# Patient Record
Sex: FEMALE | Race: WHITE | NOT HISPANIC OR LATINO | Employment: FULL TIME | URBAN - NONMETROPOLITAN AREA
[De-identification: names, ages, dates, MRNs, and addresses within clinical notes are randomized per-mention and may not be internally consistent; named-entity substitution may affect disease eponyms.]

---

## 2016-05-26 LAB — HIV 1&2 EXT: NORMAL

## 2018-03-08 ENCOUNTER — DOCUMENTATION ONLY (OUTPATIENT)
Dept: FAMILY MEDICINE | Facility: OTHER | Age: 33
End: 2018-03-08

## 2018-03-08 RX ORDER — BREAST PUMP
EACH MISCELLANEOUS
COMMUNITY
Start: 2016-10-01 | End: 2023-02-09

## 2018-03-08 RX ORDER — PRENATAL VIT/IRON FUM/FOLIC AC 27MG-0.8MG
1 TABLET ORAL DAILY
COMMUNITY
Start: 2016-08-26 | End: 2023-02-09

## 2018-03-08 RX ORDER — SERTRALINE HYDROCHLORIDE 25 MG/1
25 TABLET, FILM COATED ORAL DAILY
COMMUNITY
Start: 2016-11-11 | End: 2023-02-09

## 2019-05-23 ENCOUNTER — HOSPITAL ENCOUNTER (EMERGENCY)
Dept: HOSPITAL 11 - JP.ED | Age: 34
Discharge: HOME | End: 2019-05-23
Payer: MEDICAID

## 2019-05-23 DIAGNOSIS — Z91.048: ICD-10-CM

## 2019-05-23 DIAGNOSIS — S76.101A: ICD-10-CM

## 2019-05-23 DIAGNOSIS — S76.102A: Primary | ICD-10-CM

## 2019-05-23 DIAGNOSIS — Z88.1: ICD-10-CM
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
## 2019-05-23 PROCEDURE — 96372 THER/PROPH/DIAG INJ SC/IM: CPT

## 2019-05-23 PROCEDURE — 99283 EMERGENCY DEPT VISIT LOW MDM: CPT

## 2020-04-03 LAB — INR (EXTERNAL): 1.1 (ref 0.9–1.1)

## 2021-12-09 LAB
HPV ABSTRACT: NORMAL
PAP-ABSTRACT: NORMAL

## 2022-01-08 ENCOUNTER — HOSPITAL ENCOUNTER (EMERGENCY)
Dept: HOSPITAL 11 - JP.ED | Age: 37
Discharge: HOME | End: 2022-01-08
Payer: SELF-PAY

## 2022-01-08 DIAGNOSIS — Z91.048: ICD-10-CM

## 2022-01-08 DIAGNOSIS — R55: Primary | ICD-10-CM

## 2022-01-08 DIAGNOSIS — Z88.0: ICD-10-CM

## 2022-01-08 LAB — APAP SERPL-MCNC: 0 UG/ML (ref 10–30)

## 2022-01-08 PROCEDURE — 80053 COMPREHEN METABOLIC PANEL: CPT

## 2022-01-08 PROCEDURE — 96374 THER/PROPH/DIAG INJ IV PUSH: CPT

## 2022-01-08 PROCEDURE — 99284 EMERGENCY DEPT VISIT MOD MDM: CPT

## 2022-01-08 PROCEDURE — 80305 DRUG TEST PRSMV DIR OPT OBS: CPT

## 2022-01-08 PROCEDURE — 93005 ELECTROCARDIOGRAM TRACING: CPT

## 2022-01-08 PROCEDURE — 85025 COMPLETE CBC W/AUTO DIFF WBC: CPT

## 2022-01-08 PROCEDURE — 36415 COLL VENOUS BLD VENIPUNCTURE: CPT

## 2022-01-08 PROCEDURE — 80179 DRUG ASSAY SALICYLATE: CPT

## 2022-01-08 PROCEDURE — 93010 ELECTROCARDIOGRAM REPORT: CPT

## 2022-01-08 PROCEDURE — 80143 DRUG ASSAY ACETAMINOPHEN: CPT

## 2022-01-08 PROCEDURE — 80307 DRUG TEST PRSMV CHEM ANLYZR: CPT

## 2022-01-08 PROCEDURE — 81025 URINE PREGNANCY TEST: CPT

## 2022-01-08 PROCEDURE — 84443 ASSAY THYROID STIM HORMONE: CPT

## 2022-02-10 ENCOUNTER — TRANSFERRED RECORDS (OUTPATIENT)
Dept: MULTI SPECIALTY CLINIC | Facility: CLINIC | Age: 37
End: 2022-02-10

## 2022-02-10 LAB
ALT SERPL-CCNC: 11 IU/L (ref 6–31)
AST SERPL-CCNC: 16 IU/L (ref 10–40)
CREATININE (EXTERNAL): 0.83 MG/DL (ref 0.4–1)
GFR ESTIMATED (EXTERNAL): >60 ML/MIN/1.73M2
GLUCOSE (EXTERNAL): 91 MG/DL (ref 70–99)
POTASSIUM (EXTERNAL): 4.3 MEQ/L (ref 3.4–5.1)
TSH SERPL-ACNC: 4.27 UIU/ML (ref 0.4–3.99)

## 2023-02-09 ENCOUNTER — OFFICE VISIT (OUTPATIENT)
Dept: FAMILY MEDICINE | Facility: OTHER | Age: 38
End: 2023-02-09
Attending: PHYSICIAN ASSISTANT
Payer: COMMERCIAL

## 2023-02-09 VITALS
TEMPERATURE: 98.4 F | BODY MASS INDEX: 21.11 KG/M2 | HEART RATE: 83 BPM | OXYGEN SATURATION: 99 % | WEIGHT: 123 LBS | RESPIRATION RATE: 18 BRPM | SYSTOLIC BLOOD PRESSURE: 110 MMHG | DIASTOLIC BLOOD PRESSURE: 63 MMHG

## 2023-02-09 DIAGNOSIS — Z30.013 ENCOUNTER FOR INITIAL PRESCRIPTION OF INJECTABLE CONTRACEPTIVE: Primary | ICD-10-CM

## 2023-02-09 DIAGNOSIS — Z30.42 DEPO-PROVERA CONTRACEPTIVE STATUS: ICD-10-CM

## 2023-02-09 LAB — HCG UR QL: NEGATIVE

## 2023-02-09 PROCEDURE — 99203 OFFICE O/P NEW LOW 30 MIN: CPT | Performed by: PHYSICIAN ASSISTANT

## 2023-02-09 PROCEDURE — 250N000011 HC RX IP 250 OP 636: Performed by: PHYSICIAN ASSISTANT

## 2023-02-09 PROCEDURE — 96372 THER/PROPH/DIAG INJ SC/IM: CPT | Performed by: PHYSICIAN ASSISTANT

## 2023-02-09 PROCEDURE — 81025 URINE PREGNANCY TEST: CPT | Mod: ZL | Performed by: PHYSICIAN ASSISTANT

## 2023-02-09 RX ORDER — MEDROXYPROGESTERONE ACETATE 150 MG/ML
150 INJECTION, SUSPENSION INTRAMUSCULAR ONCE
Status: COMPLETED | OUTPATIENT
Start: 2023-02-09 | End: 2023-02-09

## 2023-02-09 RX ADMIN — MEDROXYPROGESTERONE ACETATE 150 MG: 150 INJECTION, SUSPENSION INTRAMUSCULAR at 16:04

## 2023-02-09 ASSESSMENT — PAIN SCALES - GENERAL: PAINLEVEL: NO PAIN (0)

## 2023-02-09 NOTE — PROGRESS NOTES
Assessment & Plan   Problem List Items Addressed This Visit        Other    Depo-Provera contraceptive status   Other Visit Diagnoses     Encounter for initial prescription of injectable contraceptive    -  Primary    Relevant Medications    medroxyPROGESTERone (DEPO-PROVERA) injection 150 mg (Completed)    Other Relevant Orders    Pregnancy, Urine (HCG) (Completed)         Completed UPT which is negative.  Patient was given a Depo-Provera injection.  Gave side effect profile.  Encouraged to set up a full physical with a Pap test either prior to her next Depo-Provera injection or in 3 months when her next Depo-Provera injection is due.  Gave side effect profile.  Gave patient occasion.  Highly stressed the need to not use nicotine products with Depo-Provera.    Ordering of each unique test  Prescription drug management         See Patient Instructions    Return in about 3 months (around 5/9/2023) for Physical with pap test.    Sofy Bolivar PA-C  [facility] AND [facility]    Jose Chacko is a 37 year old, presenting for the following health issues:  Contraception      Contraception    History of Present Illness       Reason for visit:  Depo    She eats 0-1 servings of fruits and vegetables daily.She consumes 1 sweetened beverage(s) daily.She exercises with enough effort to increase her heart rate 10 to 19 minutes per day.  She exercises with enough effort to increase her heart rate 4 days per week.   She is taking medications regularly.       Depo birth control.     Patient presents today wanting to restart birth control.  Previously was on Depo-Provera injections.  Tolerated the medication well in the past.  Currently has children and does not prefer to have another.  History of trying Mirena which made her feel uncomfortable and the ParaGard which caused intense, crampy, heavy periods.  Tolerated the Depo and would like to start today.  Currently just getting over her menstrual cycle.  No  pregnancy or STD concerns.  Declines testing today.  Due for a Pap test.  Would like to set up a physical with a Pap test appointment.  Otherwise feeling well.    Pap test:  12/9/2021-negative HPV, normal Pap  4/3/2020- positive HPV, normal Pap    Review of Systems   Constitutional, HEENT, cardiovascular, pulmonary, gi and gu systems are negative, except as otherwise noted.      Objective    /63 (BP Location: Right arm, Patient Position: Sitting, Cuff Size: Adult Regular)   Pulse 83   Temp 98.4  F (36.9  C) (Tympanic)   Resp 18   Wt 55.8 kg (123 lb)   LMP 02/05/2023 (Exact Date)   SpO2 99%   BMI 21.11 kg/m    Body mass index is 21.11 kg/m .  Physical Exam   GENERAL: healthy, alert and no distress  RESP: lungs clear to auscultation - no rales, rhonchi or wheezes  CV: regular rate and rhythm, normal S1 S2, no S3 or S4, no murmur, click or rub, no peripheral edema and peripheral pulses strong  MS: no gross musculoskeletal defects noted, no edema  SKIN: no suspicious lesions or rashes  NEURO: Normal strength and tone, mentation intact and speech normal  PSYCH: mentation appears normal, affect normal/bright    Results for orders placed or performed in visit on 02/09/23   Pregnancy, Urine (HCG)     Status: Normal   Result Value Ref Range    hCG Urine Qualitative Negative Negative

## 2023-02-09 NOTE — NURSING NOTE
Pt presents to clinic today for depo birth control. Currenty not on any contraception.       FOOD SECURITY SCREENING QUESTIONS:    The next two questions are to help us understand your food security.  If you are feeling you need any assistance in this area, we have resources available to support you today.    Hunger Vital Signs:  Within the past 12 months we worried whether our food would run out before we got money to buy more. Never  Within the past 12 months the food we bought just didn't last and we didn't have money to get more. Never          Medication Reconciliation: complete  Phylicia Rosario LPN,LPN on 2/9/2023 at 3:28 PM

## 2023-02-10 PROBLEM — Z30.42 DEPO-PROVERA CONTRACEPTIVE STATUS: Status: ACTIVE | Noted: 2023-02-10

## 2023-03-10 ENCOUNTER — ALLIED HEALTH/NURSE VISIT (OUTPATIENT)
Dept: FAMILY MEDICINE | Facility: OTHER | Age: 38
End: 2023-03-10
Payer: COMMERCIAL

## 2023-03-10 DIAGNOSIS — Z23 NEED FOR VACCINATION: Primary | ICD-10-CM

## 2023-03-10 PROCEDURE — 90471 IMMUNIZATION ADMIN: CPT

## 2023-03-10 PROCEDURE — 90632 HEPA VACCINE ADULT IM: CPT

## 2023-03-10 NOTE — PROGRESS NOTES
Immunization Documentation  Verified patient's first and last name, and . Stated reason for visit today is to receive HEPATITIS A vaccine. Accompained to clinic visit with SELF. Denied any concerns with previous immunizations. Allergies reviewed. VIS handout(s) reviewed and given to take home. VACCINE prepared and administered per standing order. Administration documented in IMMUNIZATIONS (see flowsheet and order for further information). Instructed to wait in lobby for 15 minutes post-injection and notify staff immediately of any reaction.     BARBARA KENT RN on 3/10/2023 at 8:47 AM

## 2023-05-06 ENCOUNTER — HEALTH MAINTENANCE LETTER (OUTPATIENT)
Age: 38
End: 2023-05-06

## 2023-05-16 ENCOUNTER — OFFICE VISIT (OUTPATIENT)
Dept: FAMILY MEDICINE | Facility: OTHER | Age: 38
End: 2023-05-16
Attending: PHYSICIAN ASSISTANT
Payer: COMMERCIAL

## 2023-05-16 VITALS
DIASTOLIC BLOOD PRESSURE: 82 MMHG | WEIGHT: 120.8 LBS | BODY MASS INDEX: 20.62 KG/M2 | TEMPERATURE: 98.7 F | HEART RATE: 91 BPM | RESPIRATION RATE: 16 BRPM | OXYGEN SATURATION: 98 % | SYSTOLIC BLOOD PRESSURE: 94 MMHG | HEIGHT: 64 IN

## 2023-05-16 DIAGNOSIS — Z13.29 SCREENING FOR THYROID DISORDER: ICD-10-CM

## 2023-05-16 DIAGNOSIS — B96.89 BV (BACTERIAL VAGINOSIS): Primary | ICD-10-CM

## 2023-05-16 DIAGNOSIS — Z13.220 SCREENING CHOLESTEROL LEVEL: ICD-10-CM

## 2023-05-16 DIAGNOSIS — Z00.00 ROUTINE GENERAL MEDICAL EXAMINATION AT A HEALTH CARE FACILITY: Primary | ICD-10-CM

## 2023-05-16 DIAGNOSIS — N92.6 ABNORMAL MENSES: ICD-10-CM

## 2023-05-16 DIAGNOSIS — Z01.419 PAP TEST, AS PART OF ROUTINE GYNECOLOGICAL EXAMINATION: ICD-10-CM

## 2023-05-16 DIAGNOSIS — N76.0 BV (BACTERIAL VAGINOSIS): Primary | ICD-10-CM

## 2023-05-16 DIAGNOSIS — Z13.1 SCREENING FOR DIABETES MELLITUS: ICD-10-CM

## 2023-05-16 DIAGNOSIS — Z11.3 SCREEN FOR STD (SEXUALLY TRANSMITTED DISEASE): ICD-10-CM

## 2023-05-16 LAB
ANION GAP SERPL CALCULATED.3IONS-SCNC: 10 MMOL/L (ref 7–15)
BASOPHILS # BLD AUTO: 0.1 10E3/UL (ref 0–0.2)
BASOPHILS NFR BLD AUTO: 1 %
BUN SERPL-MCNC: 12.3 MG/DL (ref 6–20)
C TRACH DNA SPEC QL PROBE+SIG AMP: NEGATIVE
CALCIUM SERPL-MCNC: 9.2 MG/DL (ref 8.6–10)
CHLORIDE SERPL-SCNC: 108 MMOL/L (ref 98–107)
CHOLEST SERPL-MCNC: 150 MG/DL
CLUE CELLS: PRESENT
CREAT SERPL-MCNC: 0.79 MG/DL (ref 0.51–0.95)
DEPRECATED HCO3 PLAS-SCNC: 25 MMOL/L (ref 22–29)
EOSINOPHIL # BLD AUTO: 0.1 10E3/UL (ref 0–0.7)
EOSINOPHIL NFR BLD AUTO: 1 %
ERYTHROCYTE [DISTWIDTH] IN BLOOD BY AUTOMATED COUNT: 13.2 % (ref 10–15)
GFR SERPL CREATININE-BSD FRML MDRD: >90 ML/MIN/1.73M2
GLUCOSE SERPL-MCNC: 91 MG/DL (ref 70–99)
HCG INTACT+B SERPL-ACNC: <1 MIU/ML
HCT VFR BLD AUTO: 40.6 % (ref 35–47)
HDLC SERPL-MCNC: 75 MG/DL
HGB BLD-MCNC: 13.4 G/DL (ref 11.7–15.7)
HOLD SPECIMEN: NORMAL
IMM GRANULOCYTES # BLD: 0 10E3/UL
IMM GRANULOCYTES NFR BLD: 0 %
LDLC SERPL CALC-MCNC: 64 MG/DL
LYMPHOCYTES # BLD AUTO: 1.6 10E3/UL (ref 0.8–5.3)
LYMPHOCYTES NFR BLD AUTO: 25 %
MCH RBC QN AUTO: 30.8 PG (ref 26.5–33)
MCHC RBC AUTO-ENTMCNC: 33 G/DL (ref 31.5–36.5)
MCV RBC AUTO: 93 FL (ref 78–100)
MONOCYTES # BLD AUTO: 0.5 10E3/UL (ref 0–1.3)
MONOCYTES NFR BLD AUTO: 8 %
N GONORRHOEA DNA SPEC QL NAA+PROBE: NEGATIVE
NEUTROPHILS # BLD AUTO: 4.1 10E3/UL (ref 1.6–8.3)
NEUTROPHILS NFR BLD AUTO: 65 %
NONHDLC SERPL-MCNC: 75 MG/DL
NRBC # BLD AUTO: 0 10E3/UL
NRBC BLD AUTO-RTO: 0 /100
PLATELET # BLD AUTO: 282 10E3/UL (ref 150–450)
POTASSIUM SERPL-SCNC: 3.9 MMOL/L (ref 3.4–5.3)
RBC # BLD AUTO: 4.35 10E6/UL (ref 3.8–5.2)
SODIUM SERPL-SCNC: 143 MMOL/L (ref 136–145)
TRICHOMONAS, WET PREP: ABNORMAL
TRIGL SERPL-MCNC: 55 MG/DL
TSH SERPL DL<=0.005 MIU/L-ACNC: 3.87 UIU/ML (ref 0.3–4.2)
WBC # BLD AUTO: 6.3 10E3/UL (ref 4–11)
WBC'S/HIGH POWER FIELD, WET PREP: ABNORMAL
YEAST, WET PREP: ABNORMAL

## 2023-05-16 PROCEDURE — 87210 SMEAR WET MOUNT SALINE/INK: CPT | Mod: ZL | Performed by: PHYSICIAN ASSISTANT

## 2023-05-16 PROCEDURE — 84443 ASSAY THYROID STIM HORMONE: CPT | Mod: ZL | Performed by: PHYSICIAN ASSISTANT

## 2023-05-16 PROCEDURE — 80061 LIPID PANEL: CPT | Mod: ZL | Performed by: PHYSICIAN ASSISTANT

## 2023-05-16 PROCEDURE — 85014 HEMATOCRIT: CPT | Mod: ZL | Performed by: PHYSICIAN ASSISTANT

## 2023-05-16 PROCEDURE — 87624 HPV HI-RISK TYP POOLED RSLT: CPT | Mod: ZL | Performed by: PHYSICIAN ASSISTANT

## 2023-05-16 PROCEDURE — 87591 N.GONORRHOEAE DNA AMP PROB: CPT | Mod: ZL | Performed by: PHYSICIAN ASSISTANT

## 2023-05-16 PROCEDURE — 87491 CHLMYD TRACH DNA AMP PROBE: CPT | Mod: ZL | Performed by: PHYSICIAN ASSISTANT

## 2023-05-16 PROCEDURE — G0123 SCREEN CERV/VAG THIN LAYER: HCPCS | Performed by: PHYSICIAN ASSISTANT

## 2023-05-16 PROCEDURE — 36415 COLL VENOUS BLD VENIPUNCTURE: CPT | Mod: ZL | Performed by: PHYSICIAN ASSISTANT

## 2023-05-16 PROCEDURE — 82435 ASSAY OF BLOOD CHLORIDE: CPT | Mod: ZL | Performed by: PHYSICIAN ASSISTANT

## 2023-05-16 PROCEDURE — 84702 CHORIONIC GONADOTROPIN TEST: CPT | Mod: ZL | Performed by: PHYSICIAN ASSISTANT

## 2023-05-16 PROCEDURE — 99395 PREV VISIT EST AGE 18-39: CPT | Performed by: PHYSICIAN ASSISTANT

## 2023-05-16 RX ORDER — METRONIDAZOLE 500 MG/1
500 TABLET ORAL 2 TIMES DAILY
Qty: 14 TABLET | Refills: 0 | Status: SHIPPED | OUTPATIENT
Start: 2023-05-16 | End: 2023-05-23

## 2023-05-16 ASSESSMENT — ENCOUNTER SYMPTOMS
ABDOMINAL PAIN: 1
FREQUENCY: 0
PALPITATIONS: 0
JOINT SWELLING: 0
FEVER: 0
SHORTNESS OF BREATH: 0
BREAST MASS: 0
ARTHRALGIAS: 0
PARESTHESIAS: 0
NAUSEA: 0
DIZZINESS: 0
COUGH: 0
CHILLS: 0
HEMATOCHEZIA: 0
EYE PAIN: 0
MYALGIAS: 0
HEADACHES: 0
HEARTBURN: 0
CONSTIPATION: 0
WEAKNESS: 0
HEMATURIA: 0
SORE THROAT: 0
DYSURIA: 0
NERVOUS/ANXIOUS: 1
DIARRHEA: 0

## 2023-05-16 ASSESSMENT — PAIN SCALES - GENERAL: PAINLEVEL: MILD PAIN (3)

## 2023-05-16 NOTE — NURSING NOTE
"Pt presents to clinic today for physical with pap. Patient reports concerns of irregularity with vaginal bleeding/cycle and reports no true cycle since shot in February. Patient reports \"it's not consistently bleeding or spotting, it's all over the place, it can be chunky. And I started bleeding more before the shot wore off.\" Patient would like to discuss an ultrasound due to hx or ovarian cysts and possible labs to confirm/deny pregnancy.      FOOD SECURITY SCREENING QUESTIONS:    The next two questions are to help us understand your food security.  If you are feeling you need any assistance in this area, we have resources available to support you today.    Hunger Vital Signs:  Within the past 12 months we worried whether our food would run out before we got money to buy more. Never  Within the past 12 months the food we bought just didn't last and we didn't have money to get more. Never        Advance care directive on file? No  Advance care directive provided to patient? declined     Medication Reconciliation: complete  Ranjana Waldrop LPN,LPN on 5/16/2023 at 8:03 AM     "

## 2023-05-16 NOTE — PROGRESS NOTES
SUBJECTIVE:   CC: Ginna is an 37 year old who presents for preventive health visit.       5/16/2023     7:57 AM   Additional Questions   Roomed by Ranjana   Patient has been advised of split billing requirements and indicates understanding: Yes  Healthy Habits:     Getting at least 3 servings of Calcium per day:  Yes    Bi-annual eye exam:  NO    Dental care twice a year:  Yes    Sleep apnea or symptoms of sleep apnea:  None    Diet:  Regular (no restrictions)    Frequency of exercise:  1 day/week    Duration of exercise:  Less than 15 minutes    Taking medications regularly:  Yes    Medication side effects:  Not applicable    PHQ-2 Total Score: 0    Additional concerns today:  Yes      Patient's last menstrual period was 02/05/2023 (approximate).   Contraception: depo in February, declines birth control today  Risk for STI?: no exposures however she would like to be screened  Last pap: 12/9/2021 - normal pap and HPV, needs to be repeated  4/3/2020 - positive HPV and negative pap  Any hx of abnormal paps:  yes  FH of early CA?: no  Cholesterol/DM concerns/screening: none, completed  Tobacco?: former  Lung cancer screening: na  Calcium intake: yes  DEXA: na  Last mammo: na, denies breast concerns  Colonoscopy: na  Hepatitis C screen: none, declines testing   HIV screen: 8/24/2012 - negative   Immunizations: declines Tdap     Patient recently got a Depo-Provera shot on 2/9/2023.  Was previously on Depo-Provera injections prior to the birth of her second child.  This was over 10 years ago.  History of tried the Mirena which made her feel uncomfortable and the ParaGard which caused intense, crampy, heavy periods.  In the last month of the 3-month Depo window she started getting irregular bleeding.  The amount of bleeding would go up and down.  Having cramps on the lower abdomen that radiates to the lower back.  No STD concerns however she would like to be tested.  Has had unprotected intercourse over the last  month.  Would like a pregnancy test today as well.  History of cyst in the past.  Interested in having a pelvic ultrasound to rule out concerns.  Due for a Pap test.  Not interested in continuing Depo-Provera.  Would like to hold off on birth control at this time.  Wants to keep her options open in regards to possible pregnancies for the future.    Today's PHQ-2 Score:       2023     7:49 AM   PHQ-2 (  [employer])   Q1: Little interest or pleasure in doing things 0   Q2: Feeling down, depressed or hopeless 0   PHQ-2 Score 0   Q1: Little interest or pleasure in doing things Not at all    Not at all   Q2: Feeling down, depressed or hopeless Not at all    Not at all   PHQ-2 Score 0    0       Have you ever done Advance Care Planning? (For example, a Health Directive, POLST, or a discussion with a medical provider or your loved ones about your wishes): No, advance care planning information given to patient to review.  Patient declined advance care planning discussion at this time.    Social History     Tobacco Use     Smoking status: Former     Types: Cigarettes     Quit date: 2013     Years since quittin.6     Passive exposure: Past     Smokeless tobacco: Never   Vaping Use     Vaping status: Every Day     Substances: Nicotine, Flavoring     Devices: Disposable   Substance Use Topics     Alcohol use: Yes     Comment: occ             2023     7:49 AM   Alcohol Use   Prescreen: >3 drinks/day or >7 drinks/week? No     Reviewed orders with patient.  Reviewed health maintenance and updated orders accordingly - Yes  Labs reviewed in EPIC  BP Readings from Last 3 Encounters:   23 94/82   23 110/63   16 110/60    Wt Readings from Last 3 Encounters:   23 54.8 kg (120 lb 12.8 oz)   23 55.8 kg (123 lb)   16 64.3 kg (141 lb 12.8 oz)                  Patient Active Problem List   Diagnosis     Depo-Provera contraceptive status     Past Surgical History:   Procedure Laterality  Date     OTHER SURGICAL HISTORY      ,[id number],UMBILICAL HERNIA REPAIR     NV BREAST AUGMENTATION         Social History     Tobacco Use     Smoking status: Former     Types: Cigarettes     Quit date: 2013     Years since quittin.6     Passive exposure: Past     Smokeless tobacco: Never   Vaping Use     Vaping status: Every Day     Substances: Nicotine, Flavoring     Devices: Disposable   Substance Use Topics     Alcohol use: Yes     Comment: occ     History reviewed. No pertinent family history.      No current outpatient medications on file.     Allergies   Allergen Reactions     Amoxicillin      Other reaction(s): Stomach Upset  Stomach upset  Stomach upset     Amoxicillin-Pot Clavulanate      Other reaction(s): Vomiting     Amoxicillin-Pot Clavulanate      Nickel Rash     Sertraline Other (See Comments)     Nightmares, felt hallowed out     Recent Labs   Lab Test 16  2305   CR 0.47*   GFRESTBLACK >60   POTASSIUM 3.5        Breast Cancer Screenin/16/2023     7:49 AM   Breast CA Risk Assessment (FHS-7)   Do you have a family history of breast, colon, or ovarian cancer? No / Unknown         Patient under 40 years of age: Routine Mammogram Screening not recommended.   Pertinent mammograms are reviewed under the imaging tab.    History of abnormal Pap smear: Last 3 Pap and HPV Results:         Reviewed and updated as needed this visit by clinical staff   Tobacco  Allergies  Meds  Problems  Med Hx  Surg Hx  Fam Hx          Reviewed and updated as needed this visit by Provider   Tobacco  Allergies  Meds  Problems  Med Hx  Surg Hx  Fam Hx         Past Medical History:   Diagnosis Date     Pregnant state, incidental           Past Surgical History:   Procedure Laterality Date     OTHER SURGICAL HISTORY      ,[id number],UMBILICAL HERNIA REPAIR     NV BREAST AUGMENTATION       OB History    Para Term  AB Living   3 3 3 0 0 3   SAB IAB Ectopic Multiple Live Births  "  0 0 0 0 0       Review of Systems   Constitutional: Negative for chills and fever.   HENT: Positive for congestion. Negative for ear pain, hearing loss and sore throat.    Eyes: Negative for pain and visual disturbance.   Respiratory: Negative for cough and shortness of breath.    Cardiovascular: Negative for chest pain, palpitations and peripheral edema.   Gastrointestinal: Positive for abdominal pain. Negative for constipation, diarrhea, heartburn, hematochezia and nausea.   Breasts:  Negative for tenderness, breast mass and discharge.   Genitourinary: Positive for pelvic pain and vaginal bleeding. Negative for dysuria, frequency, genital sores, hematuria, urgency and vaginal discharge.   Musculoskeletal: Negative for arthralgias, joint swelling and myalgias.   Skin: Negative for rash.   Neurological: Negative for dizziness, weakness, headaches and paresthesias.   Psychiatric/Behavioral: Negative for mood changes. The patient is nervous/anxious.           OBJECTIVE:   BP 94/82 (BP Location: Right arm, Patient Position: Sitting, Cuff Size: Adult Regular)   Pulse 91   Temp 98.7  F (37.1  C) (Tympanic)   Resp 16   Ht 1.626 m (5' 4\")   Wt 54.8 kg (120 lb 12.8 oz)   LMP 02/05/2023 (Approximate)   SpO2 98%   BMI 20.74 kg/m    Physical Exam  GENERAL: healthy, alert and no distress  EYES: Eyes grossly normal to inspection, PERRL and conjunctivae and sclerae normal  HENT: ear canals and TM's normal, nose and mouth without ulcers or lesions  NECK: no adenopathy, no asymmetry, masses, or scars and thyroid normal to palpation  RESP: lungs clear to auscultation - no rales, rhonchi or wheezes  CV: regular rate and rhythm, normal S1 S2, no S3 or S4, no murmur, click or rub, no peripheral edema and peripheral pulses strong  ABDOMEN: soft, no hepatosplenomegaly, no masses and bowel sounds normal.  Mild pain to palpation over the left lower quadrant, right lower quadrant, and suprapubic region.   (female): normal female " external genitalia, normal urethral meatus, vaginal mucosa pink, moist, well rugated, and normal cervix/adnexa/uterus without masses or discharge.  Mild amount of bright red vaginal bleeding appreciated.  Pap completed: Yes  MS: no gross musculoskeletal defects noted, no edema  SKIN: no suspicious lesions or rashes  NEURO: Normal strength and tone, mentation intact and speech normal  PSYCH: mentation appears normal, affect normal/bright    Diagnostic Test Results:  Labs reviewed in Epic    ASSESSMENT/PLAN:       ICD-10-CM    1. Routine general medical examination at a health care facility  Z00.00       2. Abnormal menses  N92.6 TSH Reflex GH     Basic Metabolic Panel     CBC and Differential     HCG quantitative pregnancy     US Pelvic Complete with Transvaginal     HCG quantitative pregnancy     Basic Metabolic Panel     CBC and Differential     TSH Reflex GH      3. Screening for thyroid disorder  Z13.29 TSH Reflex GH     TSH Reflex GH      4. Screening cholesterol level  Z13.220 Lipid Panel     Lipid Panel      5. Screening for diabetes mellitus  Z13.1 Basic Metabolic Panel     Basic Metabolic Panel      6. Pap test, as part of routine gynecological examination  Z01.419 Pap Screen with HPV - recommended age 30 - 65 years      7. Screen for STD (sexually transmitted disease)  Z11.3 Wet Prep, Genital     GC/Chlamydia by PCR        Completed gonorrhea, chlamydia, and vaginal wet prep for STD screen.  Completed Pap and HPV for cervical cancer screening.  Discussed irregular menses at length.  Irregular menses likely due to Depo-Provera injection.  Declines repeat today.  Completed a thorough lab work-up to rule out concerns.  Also ordered pelvic ultrasound to rule out concerns.  Encourage close monitoring over the next 2 to 4 weeks.  Can message for a 5-day course of Provera to help reset her menstrual cycle if needed.  Declines further birth control at this time.  Can call for an OB/GYN referral if  needed.    Completed thyroid, cholesterol, and diabetes mellitus screening.    Encouraged repeat physical in 1 year.  Gave patient education.    Patient has been advised of split billing requirements and indicates understanding: Yes      COUNSELING:  Reviewed preventive health counseling, as reflected in patient instructions       Regular exercise       Healthy diet/nutrition       Vision screening       Hearing screening       Contraception       Safe sex practices/STD prevention       Consider Hep C screening for all patients one time for ages 18-79 years        She reports that she quit smoking about 9 years ago. Her smoking use included cigarettes. She has been exposed to tobacco smoke. She has never used smokeless tobacco.      Sofy Bolivar PA-C  [facility] AND [facility]

## 2023-05-16 NOTE — PATIENT INSTRUCTIONS
"Completed lab work to rule out concerns.  Ordered pelvic ultrasound.  Please message in the next 2 weeks to give me an update on how your menstrual cycle is doing.  Can message for a 5-day course of Provera to reset your cycle or  a OB/GYN consult if needed.    Healthy Strategies  Eat at least 3 meals a day and never skip breakfast.  Eat more slowly.  Decrease portion size.  Provide structure by using meal replacement bars or shakes, and/or low calorie frozen meals.  For good nutrition incorporate fruit, vegetables, whole grains, lean protein, and low-fat dairy.  Remove trigger foods from yourenvironment to avoid impulse eating.  Increase physical activity: get a pedometer and aim for 10,000 steps a day or 30-35 minutes of activity 5 days per week.  Weigh yourself daily or at least weekly.  Keep a record of what you eat and your activity.  Establish a support system such as afriend, group or program.    11. Read Derrek Mclaughlin's \"Eat to Live\". Remember it is important to have a minimum of 1200 calories a day, okay to use olive oil, 40 grams of fiber daily. No more than two servings (the size of your palm) of red meat a week.     Please consider the following general health recommendations:    Eat a quality diet (generally, low in simple sugars, starches, cholesterol and saturated fat.)    Please get 1200 mg of calcium in divided doses with 800 units vitamin D in your diet daily. Take supplements as needed to obtain full recommended amounts.     Stay physically active. Regular walking or other exercise is one of the best ways to minimize pain of arthritis; maintain independence and mobility; maintain bone strength; maintain conditioning of your heart. Find something you enjoy and a friend to do it with you.    Maintain ideal weight. Your Body mass index is Body mass index is 20.74 kg/m .. Generally a BMI of 20-25 is considered ideal. Overweight is defined as 25-30, obese is 30-35 and markedly obese is greater than " 35.    Apply sun block (SPF 25 or greater) on exposed skin anytime you are out in the sun to prevent skin cancer.     Wear a seatbelt whenever you are in a car.    Obtain a flu shot every fall.    You should have a tetanus booster at least once every 10 years.    Check blood sugar annually. Cholesterol annually unless you have had a normal level when last checked within 5 years.     I recommend that you have a general physical exam every year. You should have a pap test every 3 years between the ages of 21 and 30 and every 3-5 years between the ages of 30 and 65 depending on your test unless you have had previous abnormal pap smears, (in these cases the exams and PAP's should be done on a schedule as recommended by your primary care provider). If you have had hysterectomy in the past, your future Pap plan may be different.        Preventive Health Recommendations  Female Ages 26 - 39  Yearly exam:   See your health care provider every year in order to  Review health changes.   Discuss preventive care.    Review your medicines if you your doctor has prescribed any.    Until age 30: Get a Pap test every three years (more often if you have had an abnormal result).    After age 30: Talk to your doctor about whether you should have a Pap test every 3 years or have a Pap test with HPV screening every 5 years.   You do not need a Pap test if your uterus was removed (hysterectomy) and you have not had cancer.  You should be tested each year for STDs (sexually transmitted diseases), if you're at risk.   Talk to your provider about how often to have your cholesterol checked.  If you are at risk for diabetes, you should have a diabetes test (fasting glucose).  Shots: Get a flu shot each year. Get a tetanus shot every 10 years.   Nutrition:   Eat at least 5 servings of fruits and vegetables each day.  Eat whole-grain bread, whole-wheat pasta and brown rice instead of white grains and rice.  Get adequate Calcium and Vitamin D.      Lifestyle  Exercise at least 150 minutes a week (30 minutes a day, 5 days of the week). This will help you control your weight and prevent disease.  Limit alcohol to one drink per day.  No smoking.   Wear sunscreen to prevent skin cancer.  See your dentist every six months for an exam and cleaning.

## 2023-05-19 LAB
BKR LAB AP GYN ADEQUACY: ABNORMAL
BKR LAB AP GYN INTERPRETATION: ABNORMAL
BKR LAB AP HPV REFLEX: ABNORMAL
BKR LAB AP LMP: ABNORMAL
BKR LAB AP PREVIOUS ABNL DX: ABNORMAL
BKR LAB AP PREVIOUS ABNORMAL: ABNORMAL
HUMAN PAPILLOMA VIRUS 16 DNA: NEGATIVE
HUMAN PAPILLOMA VIRUS 18 DNA: NEGATIVE
HUMAN PAPILLOMA VIRUS FINAL DIAGNOSIS: NORMAL
HUMAN PAPILLOMA VIRUS OTHER HR: NEGATIVE
PATH REPORT.COMMENTS IMP SPEC: ABNORMAL
PATH REPORT.COMMENTS IMP SPEC: ABNORMAL
PATH REPORT.RELEVANT HX SPEC: ABNORMAL

## 2023-05-19 PROCEDURE — 88141 CYTOPATH C/V INTERPRET: CPT | Performed by: PATHOLOGY

## 2023-05-26 ENCOUNTER — HOSPITAL ENCOUNTER (OUTPATIENT)
Dept: ULTRASOUND IMAGING | Facility: OTHER | Age: 38
Discharge: HOME | End: 2023-05-26
Attending: PHYSICIAN ASSISTANT | Admitting: PHYSICIAN ASSISTANT
Payer: COMMERCIAL

## 2023-05-26 DIAGNOSIS — N92.6 ABNORMAL MENSES: ICD-10-CM

## 2023-05-26 DIAGNOSIS — N94.89 PELVIC CONGESTION SYNDROME: ICD-10-CM

## 2023-05-26 DIAGNOSIS — N92.6 ABNORMAL MENSES: Primary | ICD-10-CM

## 2023-05-26 PROCEDURE — 76856 US EXAM PELVIC COMPLETE: CPT

## 2023-06-13 ENCOUNTER — OFFICE VISIT (OUTPATIENT)
Dept: OBGYN | Facility: OTHER | Age: 38
End: 2023-06-13
Attending: PHYSICIAN ASSISTANT
Payer: COMMERCIAL

## 2023-06-13 VITALS
BODY MASS INDEX: 20.77 KG/M2 | HEART RATE: 84 BPM | DIASTOLIC BLOOD PRESSURE: 60 MMHG | SYSTOLIC BLOOD PRESSURE: 100 MMHG | WEIGHT: 121 LBS

## 2023-06-13 DIAGNOSIS — N94.89 PELVIC CONGESTION SYNDROME: ICD-10-CM

## 2023-06-13 DIAGNOSIS — N92.6 ABNORMAL MENSES: ICD-10-CM

## 2023-06-13 LAB
ALBUMIN UR-MCNC: NEGATIVE MG/DL
APPEARANCE UR: CLEAR
BILIRUB UR QL STRIP: NEGATIVE
COLOR UR AUTO: NORMAL
GLUCOSE UR STRIP-MCNC: NEGATIVE MG/DL
HCG INTACT+B SERPL-ACNC: <1 MIU/ML
HGB UR QL STRIP: NEGATIVE
KETONES UR STRIP-MCNC: NEGATIVE MG/DL
LEUKOCYTE ESTERASE UR QL STRIP: NEGATIVE
NITRATE UR QL: NEGATIVE
PH UR STRIP: 7 [PH] (ref 5–9)
PROLACTIN SERPL 3RD IS-MCNC: 19 NG/ML (ref 5–23)
SP GR UR STRIP: 1.01 (ref 1–1.03)
UROBILINOGEN UR STRIP-MCNC: NORMAL MG/DL

## 2023-06-13 PROCEDURE — 36415 COLL VENOUS BLD VENIPUNCTURE: CPT | Mod: ZL | Performed by: OBSTETRICS & GYNECOLOGY

## 2023-06-13 PROCEDURE — 84702 CHORIONIC GONADOTROPIN TEST: CPT | Mod: ZL | Performed by: OBSTETRICS & GYNECOLOGY

## 2023-06-13 PROCEDURE — 99204 OFFICE O/P NEW MOD 45 MIN: CPT | Performed by: OBSTETRICS & GYNECOLOGY

## 2023-06-13 PROCEDURE — 81003 URINALYSIS AUTO W/O SCOPE: CPT | Mod: ZL | Performed by: OBSTETRICS & GYNECOLOGY

## 2023-06-13 PROCEDURE — 84146 ASSAY OF PROLACTIN: CPT | Mod: ZL | Performed by: OBSTETRICS & GYNECOLOGY

## 2023-06-13 RX ORDER — NORGESTIMATE AND ETHINYL ESTRADIOL 0.25-0.035
1 KIT ORAL DAILY
Qty: 84 TABLET | Refills: 0 | Status: SHIPPED | OUTPATIENT
Start: 2023-06-13 | End: 2023-11-21

## 2023-06-13 ASSESSMENT — PAIN SCALES - GENERAL: PAINLEVEL: NO PAIN (0)

## 2023-06-13 NOTE — NURSING NOTE
"Chief Complaint   Patient presents with     Consult     Abnormal Menses, Pelvic Congestion syndrome     Patient is here for bleeding since May. Patient went off Depa and bleed for a month, but bleeding has stopped now.   Initial /60   Pulse 84   Wt 54.9 kg (121 lb)   LMP  (LMP Unknown)   BMI 20.77 kg/m   Estimated body mass index is 20.77 kg/m  as calculated from the following:    Height as of 5/16/23: 1.626 m (5' 4\").    Weight as of this encounter: 54.9 kg (121 lb).  Medication Reconciliation: complete          Lamar Daily LPN  "

## 2023-06-13 NOTE — PROGRESS NOTES
CC: irregular menses and breast tenderness following Depo  HPI:  Ginna is a 37 year old female who presents for evaluation of her irregular bleeding. She had one shot of Depot which would have been due for dosing again in the end of April. Since that time she has noted onset of breast tenderness, back pain, and nausea similar to what her pregnancies were like. She has taken a pregnancy test in the last week that was negative. She has had intercourse in the last month.  She has three children and is not certain if she wants more. She has used an IUD in the past and did not like the feeling.  She is a  single mom of three.  She had an US and tsh done recently which failed to show a cause for her symptoms, with normal tsh and US with normal anatomy and generously sized parauterine veins.    No LMP recorded (lmp unknown).  Last pap was ASCUS with Neg HR HPV    OB History    Para Term  AB Living   3 3 3 0 0 3   SAB IAB Ectopic Multiple Live Births   0 0 0 0 0     Past Medical History:   Diagnosis Date     Pregnant state, incidental          Past Surgical History:   Procedure Laterality Date     OTHER SURGICAL HISTORY      2013,GUG403,UMBILICAL HERNIA REPAIR     [place] BREAST AUGMENTATION       Social History     Socioeconomic History     Marital status:      Spouse name: Laci     Number of children: Not on file     Years of education: Not on file     Highest education level: Not on file   Occupational History     Not on file   Tobacco Use     Smoking status: Former     Types: Cigarettes     Quit date: 2013     Years since quittin.7     Passive exposure: Past     Smokeless tobacco: Never   Vaping Use     Vaping status: Every Day     Substances: Nicotine, Flavoring     Devices: Disposable   Substance and Sexual Activity     Alcohol use: Yes     Comment: occ     Drug use: Never     Comment: Drug use: No     Sexual activity: Yes     Partners: Male     Birth control/protection:  Surgical, Surgical     Comment: Birth control method: vas   Other Topics Concern     Not on file   Social History Narrative    .  VB      Social Determinants of Health     Financial Resource Strain: Not on file   Food Insecurity: Not on file   Transportation Needs: Not on file   Physical Activity: Not on file   Stress: Not on file   Social Connections: Not on file   Intimate Partner Violence: Not on file   Housing Stability: Not on file     History reviewed. No pertinent family history.    Current Outpatient Medications   Medication     Probiotic Product (PROBIOTIC DAILY PO)     No current facility-administered medications for this visit.     Allergies   Allergen Reactions     Amoxicillin      Other reaction(s): Stomach Upset  Stomach upset  Stomach upset     Amoxicillin-Pot Clavulanate      Other reaction(s): Vomiting     Amoxicillin-Pot Clavulanate      Nickel Rash     Sertraline Other (See Comments)     Nightmares, felt hallowed out     /60   Pulse 84   Wt 54.9 kg (121 lb)   LMP  (LMP Unknown)   BMI 20.77 kg/m      REVIEW OF SYSTEMS  Neg except as above  No blood in urine or stools  Vaginal bleeding stopped last week.    Exam:  Constitutional: healthy, alert, active and no distress  US images and report reviewed.    Lab: No results found for any visits on 06/13/23.    ASSESSMENT/PLAN :  1. Abnormal menses    2. Pelvic congestion syndrome      (N92.6) Abnormal menses  Comment:   Plan: HCG quantitative pregnancy, Prolactin,         norgestimate-ethinyl estradiol (ORTHO-CYCLEN)         0.25-35 MG-MCG tablet, UA Macroscopic with         reflex to Microscopic and Culture, CANCELED: UA        Macroscopic with reflex to Microscopic and         Culture    If hcg level is neg will try her on OCP's for three months, and then decide on alternatives if need be.  Will notify her of test results later today.      Camden Freitas MD FACOG  10:54 AM 6/13/2023

## 2023-11-20 ENCOUNTER — HOSPITAL ENCOUNTER (EMERGENCY)
Facility: OTHER | Age: 38
Discharge: HOME | End: 2023-11-20
Attending: EMERGENCY MEDICINE | Admitting: EMERGENCY MEDICINE
Payer: COMMERCIAL

## 2023-11-20 ENCOUNTER — APPOINTMENT (OUTPATIENT)
Dept: CT IMAGING | Facility: OTHER | Age: 38
End: 2023-11-20
Attending: EMERGENCY MEDICINE
Payer: COMMERCIAL

## 2023-11-20 VITALS
HEIGHT: 64 IN | BODY MASS INDEX: 21.34 KG/M2 | SYSTOLIC BLOOD PRESSURE: 93 MMHG | OXYGEN SATURATION: 99 % | RESPIRATION RATE: 9 BRPM | WEIGHT: 125 LBS | HEART RATE: 78 BPM | TEMPERATURE: 98.4 F | DIASTOLIC BLOOD PRESSURE: 61 MMHG

## 2023-11-20 DIAGNOSIS — I26.99 PULMONARY EMBOLISM (H): Primary | ICD-10-CM

## 2023-11-20 DIAGNOSIS — I26.93 SINGLE SUBSEGMENTAL PULMONARY EMBOLISM WITHOUT ACUTE COR PULMONALE (H): ICD-10-CM

## 2023-11-20 LAB
ANION GAP SERPL CALCULATED.3IONS-SCNC: 11 MMOL/L (ref 7–15)
BASOPHILS # BLD AUTO: 0.1 10E3/UL (ref 0–0.2)
BASOPHILS NFR BLD AUTO: 1 %
BUN SERPL-MCNC: 12.4 MG/DL (ref 6–20)
CALCIUM SERPL-MCNC: 9.2 MG/DL (ref 8.6–10)
CHLORIDE SERPL-SCNC: 103 MMOL/L (ref 98–107)
CREAT SERPL-MCNC: 0.82 MG/DL (ref 0.51–0.95)
D DIMER PPP FEU-MCNC: 3.29 UG/ML FEU (ref 0–0.5)
DEPRECATED HCO3 PLAS-SCNC: 23 MMOL/L (ref 22–29)
EGFRCR SERPLBLD CKD-EPI 2021: >90 ML/MIN/1.73M2
EOSINOPHIL # BLD AUTO: 0.1 10E3/UL (ref 0–0.7)
EOSINOPHIL NFR BLD AUTO: 1 %
ERYTHROCYTE [DISTWIDTH] IN BLOOD BY AUTOMATED COUNT: 13.6 % (ref 10–15)
FLUAV RNA SPEC QL NAA+PROBE: NEGATIVE
FLUBV RNA RESP QL NAA+PROBE: NEGATIVE
GLUCOSE SERPL-MCNC: 88 MG/DL (ref 70–99)
HCT VFR BLD AUTO: 38.1 % (ref 35–47)
HGB BLD-MCNC: 12.6 G/DL (ref 11.7–15.7)
HOLD SPECIMEN: NORMAL
IMM GRANULOCYTES # BLD: 0.1 10E3/UL
IMM GRANULOCYTES NFR BLD: 1 %
LYMPHOCYTES # BLD AUTO: 1.6 10E3/UL (ref 0.8–5.3)
LYMPHOCYTES NFR BLD AUTO: 15 %
MCH RBC QN AUTO: 29.9 PG (ref 26.5–33)
MCHC RBC AUTO-ENTMCNC: 33.1 G/DL (ref 31.5–36.5)
MCV RBC AUTO: 90 FL (ref 78–100)
MONOCYTES # BLD AUTO: 0.6 10E3/UL (ref 0–1.3)
MONOCYTES NFR BLD AUTO: 5 %
NEUTROPHILS # BLD AUTO: 8.5 10E3/UL (ref 1.6–8.3)
NEUTROPHILS NFR BLD AUTO: 77 %
NRBC # BLD AUTO: 0 10E3/UL
NRBC BLD AUTO-RTO: 0 /100
PLATELET # BLD AUTO: 266 10E3/UL (ref 150–450)
POTASSIUM SERPL-SCNC: 4 MMOL/L (ref 3.4–5.3)
RBC # BLD AUTO: 4.22 10E6/UL (ref 3.8–5.2)
RSV RNA SPEC NAA+PROBE: NEGATIVE
SARS-COV-2 RNA RESP QL NAA+PROBE: NEGATIVE
SODIUM SERPL-SCNC: 137 MMOL/L (ref 135–145)
TROPONIN T SERPL HS-MCNC: <6 NG/L
WBC # BLD AUTO: 10.9 10E3/UL (ref 4–11)

## 2023-11-20 PROCEDURE — 250N000011 HC RX IP 250 OP 636: Performed by: EMERGENCY MEDICINE

## 2023-11-20 PROCEDURE — 84484 ASSAY OF TROPONIN QUANT: CPT | Performed by: EMERGENCY MEDICINE

## 2023-11-20 PROCEDURE — 87637 SARSCOV2&INF A&B&RSV AMP PRB: CPT | Performed by: EMERGENCY MEDICINE

## 2023-11-20 PROCEDURE — 85041 AUTOMATED RBC COUNT: CPT | Performed by: EMERGENCY MEDICINE

## 2023-11-20 PROCEDURE — 94640 AIRWAY INHALATION TREATMENT: CPT

## 2023-11-20 PROCEDURE — 93010 ELECTROCARDIOGRAM REPORT: CPT | Performed by: INTERNAL MEDICINE

## 2023-11-20 PROCEDURE — 80048 BASIC METABOLIC PNL TOTAL CA: CPT | Performed by: EMERGENCY MEDICINE

## 2023-11-20 PROCEDURE — 96374 THER/PROPH/DIAG INJ IV PUSH: CPT | Performed by: EMERGENCY MEDICINE

## 2023-11-20 PROCEDURE — C9803 HOPD COVID-19 SPEC COLLECT: HCPCS | Performed by: EMERGENCY MEDICINE

## 2023-11-20 PROCEDURE — 96375 TX/PRO/DX INJ NEW DRUG ADDON: CPT | Mod: XU | Performed by: EMERGENCY MEDICINE

## 2023-11-20 PROCEDURE — 250N000009 HC RX 250: Performed by: EMERGENCY MEDICINE

## 2023-11-20 PROCEDURE — 36415 COLL VENOUS BLD VENIPUNCTURE: CPT | Performed by: EMERGENCY MEDICINE

## 2023-11-20 PROCEDURE — 99285 EMERGENCY DEPT VISIT HI MDM: CPT | Mod: 25 | Performed by: EMERGENCY MEDICINE

## 2023-11-20 PROCEDURE — 71275 CT ANGIOGRAPHY CHEST: CPT

## 2023-11-20 PROCEDURE — 99284 EMERGENCY DEPT VISIT MOD MDM: CPT | Performed by: EMERGENCY MEDICINE

## 2023-11-20 PROCEDURE — 93005 ELECTROCARDIOGRAM TRACING: CPT | Performed by: EMERGENCY MEDICINE

## 2023-11-20 PROCEDURE — 85379 FIBRIN DEGRADATION QUANT: CPT | Performed by: EMERGENCY MEDICINE

## 2023-11-20 RX ORDER — FENTANYL CITRATE 50 UG/ML
50 INJECTION, SOLUTION INTRAMUSCULAR; INTRAVENOUS ONCE
Status: COMPLETED | OUTPATIENT
Start: 2023-11-20 | End: 2023-11-20

## 2023-11-20 RX ORDER — NORELGESTROMIN AND ETHINYL ESTRADIOL 150; 35 UG/D; UG/D
PATCH TRANSDERMAL
COMMUNITY
Start: 2023-11-14 | End: 2023-11-21

## 2023-11-20 RX ORDER — APIXABAN 5 MG (74)
KIT ORAL
Qty: 74 EACH | Refills: 0 | Status: SHIPPED | OUTPATIENT
Start: 2023-11-20 | End: 2023-12-20

## 2023-11-20 RX ORDER — IPRATROPIUM BROMIDE AND ALBUTEROL SULFATE 2.5; .5 MG/3ML; MG/3ML
3 SOLUTION RESPIRATORY (INHALATION) ONCE
Status: COMPLETED | OUTPATIENT
Start: 2023-11-20 | End: 2023-11-20

## 2023-11-20 RX ORDER — IOPAMIDOL 755 MG/ML
65 INJECTION, SOLUTION INTRAVASCULAR ONCE
Status: COMPLETED | OUTPATIENT
Start: 2023-11-20 | End: 2023-11-20

## 2023-11-20 RX ORDER — KETOROLAC TROMETHAMINE 15 MG/ML
15 INJECTION, SOLUTION INTRAMUSCULAR; INTRAVENOUS ONCE
Status: COMPLETED | OUTPATIENT
Start: 2023-11-20 | End: 2023-11-20

## 2023-11-20 RX ADMIN — FENTANYL CITRATE 50 MCG: 50 INJECTION, SOLUTION INTRAMUSCULAR; INTRAVENOUS at 11:21

## 2023-11-20 RX ADMIN — IOPAMIDOL 65 ML: 755 INJECTION, SOLUTION INTRAVENOUS at 09:54

## 2023-11-20 RX ADMIN — IPRATROPIUM BROMIDE AND ALBUTEROL SULFATE 3 ML: 2.5; .5 SOLUTION RESPIRATORY (INHALATION) at 09:05

## 2023-11-20 RX ADMIN — KETOROLAC TROMETHAMINE 15 MG: 15 INJECTION, SOLUTION INTRAMUSCULAR; INTRAVENOUS at 08:54

## 2023-11-20 ASSESSMENT — ACTIVITIES OF DAILY LIVING (ADL)
ADLS_ACUITY_SCORE: 35
ADLS_ACUITY_SCORE: 35

## 2023-11-20 NOTE — ED NOTES
Patient reports no change in pain after medications.    11/20/23 6158   Rounding   Rounds done on this Patient? Yes   Side Rails Up? Yes x2   Call Light Within Reach? Yes   Head of Bed Up? Yes   Pain/Comfort   Patient Currently in Pain yes   Preferred Pain Scale (Adult) DVPRS (Group)   DVPRS Pain Rating Score   (DVPRS) Pain Rating Score  8-Awful, hard to do anything   Pain/Comfort/Sleep   Pain Side/Orientation left   Pain Description constant;sharp   Pain Location chest

## 2023-11-20 NOTE — ED PROVIDER NOTES
[facility]  Emergency Department Visit Note    Chest Pain      History of Present Illness     Ginna Landa is a 38 year old female presenting with chest pain. This pain started approximately 1 day prior to arrival and the onset was while at rest. The pain is characterized as sharp, worse with deep inspiration , does radiate from her lower left chest wall to her left shoulder, and is not associated with fevers, cough, nausea, vomiting, diaphoresis, change with respiration. The patient is now had similar symptoms in the past. No falls or other recent injuries.  She was started on the Xulane patch in September for birth control    Medications:  Prior to Admission medications    Medication Sig Last Dose Taking? Auth Provider Long Term End Date   Apixaban Starter Pack (ELIQUIS DVT/PE STARTER PACK) 5 MG TBPK Take 10 mg by mouth 2 times daily for 7 days, THEN 5 mg 2 times daily for 23 days.  Yes Carolyn Palacios MD  23   XULANE 150-35 MCG/24HR patch PLACE PATCH ON APPROPRIATE SKIN AREA FOR 1 WEEK. REMOVE PATCH AND PLACE NEW ONE IN A DIFFERENT APPROVED AREA. REPEAT EVERY WEEK  Yes Reported, Patient Yes    norgestimate-ethinyl estradiol (ORTHO-CYCLEN) 0.25-35 MG-MCG tablet Take 1 tablet by mouth daily   Camden Freitas MD Yes    Probiotic Product (PROBIOTIC DAILY PO)    Reported, Patient         Allergies:  Allergies   Allergen Reactions    Amoxicillin      Other reaction(s): Stomach Upset  Stomach upset  Stomach upset    Amoxicillin-Pot Clavulanate      Other reaction(s): Vomiting    Nickel Rash    Sertraline Other (See Comments)     Nightmares, felt hallowed out       Problem List:  Patient Active Problem List   Diagnosis    Depo-Provera contraceptive status       Past Medical History:  Past Medical History:   Diagnosis Date    Pregnant state, incidental            Past Surgical History:  Past Surgical History:   Procedure Laterality Date    OTHER SURGICAL HISTORY       "2013,LOS545,UMBILICAL HERNIA REPAIR    [place] BREAST AUGMENTATION         Social History:  Social History     Tobacco Use    Smoking status: Former     Types: Cigarettes     Quit date: 9/29/2013     Years since quitting: 10.1     Passive exposure: Past    Smokeless tobacco: Never   Vaping Use    Vaping Use: Every day    Substances: Nicotine, Flavoring    Devices: Disposable   Substance Use Topics    Alcohol use: Yes     Comment: occ    Drug use: Never     Comment: Drug use: No       Review of Systems:  Complete review of systems obtained and pertinent positive and negative findings noted in HPI. Review of systems otherwise negative.    Physical Exam     Vital signs: BP 93/61   Pulse 78   Temp 98.4  F (36.9  C) (Tympanic)   Resp (!) 9   Ht 1.626 m (5' 4\")   Wt 56.7 kg (125 lb)   LMP 11/13/2023   SpO2 99%   BMI 21.46 kg/m      Physical Exam:    General: awake and alert, comfortable  HEENT: atraumatic, no scleral injection, no nasal discharge, neck supple  Chest wall: palpation of the chest wall does not reproduce symptoms  Chest: clear to auscultation bilaterally without wheezes or crackles, non labored respirations  Cardiovascular: regular rate and rhythm, no murmurs or gallops  Abdomen: soft, nontender, no rebound or guarding, nondistended  Extremities: no deformities, edema, or tenderness  Skin: warm, dry, no rashes  Neuro: alert and oriented x 3, moving extremities x 4, ambulates without difficulty      Medical Decision Making & ED Course     Ginna Landa is a 38 year old female presenting with chest pain. Differential includes acute coronary syndrome, pulmonary embolism, aortic dissection, pneumonia, esophageal spasm, gastroesophageal reflux, reactive airway disease, chest wall pain, stress or anxiety. Concern for a life threatening etiology of symptoms prompts EKG and laboratory evaluation.  Leading differential is pulmonary embolus.  Patient's Wells criteria is over 2 and her D-dimer is significantly " elevated.  CTA was performed which did show subsegmental PE in the left lower lobe.  Patient is hemodynamically normal and stable and has no risk factors anticoagulation.  I did consult with pharmacy and she is able to start Eliquis.  This prescription was sent in.  She is to discontinue her birth control.  It is recommended that she use condoms until she is able to get in with her primary care provider.  An appointment was made for the 22nd for reevaluation.  Return precautions given and she is discharged home in satisfactory and stable condition     I have reviewed the patients ECG, laboratory studies, imaging, and medical records.  .    Diagnosis & Disposition     Diagnosis:  1. Pulmonary embolism (H)    2. Single subsegmental pulmonary embolism without acute cor pulmonale (H)          Disposition:  Home    MD Suzanne Aparicio Theresa M, MD  11/20/23 0237

## 2023-11-20 NOTE — ED TRIAGE NOTES
Pt states she is having left sided chest pain that radiates to her left shoulder.  Pt states this started last night while making dinner.  Pt states it got worse this morning when she woke up.  Pt states she cannot take a deep breathe.  Pt also states that she did not do anything to cause this pain.  Pain is 7/10.  Pain is a sharp pain.  Nothing makes the pain better or worse..  pt denies taking any OTC medications for the pain.

## 2023-11-20 NOTE — Clinical Note
Ginna Landa was seen and treated in our emergency department on 11/20/2023.  She may return to work on 11/27/2023.       If you have any questions or concerns, please don't hesitate to call.      Carolyn Palacios MD

## 2023-11-21 ENCOUNTER — APPOINTMENT (OUTPATIENT)
Dept: GENERAL RADIOLOGY | Facility: OTHER | Age: 38
End: 2023-11-21
Attending: FAMILY MEDICINE
Payer: COMMERCIAL

## 2023-11-21 ENCOUNTER — HOSPITAL ENCOUNTER (EMERGENCY)
Facility: OTHER | Age: 38
Discharge: HOME | End: 2023-11-21
Attending: FAMILY MEDICINE | Admitting: FAMILY MEDICINE
Payer: COMMERCIAL

## 2023-11-21 VITALS
RESPIRATION RATE: 22 BRPM | SYSTOLIC BLOOD PRESSURE: 84 MMHG | BODY MASS INDEX: 21.34 KG/M2 | HEART RATE: 76 BPM | DIASTOLIC BLOOD PRESSURE: 53 MMHG | WEIGHT: 125 LBS | TEMPERATURE: 99.1 F | OXYGEN SATURATION: 99 % | HEIGHT: 64 IN

## 2023-11-21 DIAGNOSIS — R07.9 LEFT-SIDED CHEST PAIN: ICD-10-CM

## 2023-11-21 DIAGNOSIS — I26.99: ICD-10-CM

## 2023-11-21 LAB
ANION GAP SERPL CALCULATED.3IONS-SCNC: 11 MMOL/L (ref 7–15)
ATRIAL RATE - MUSE: 79 BPM
ATRIAL RATE - MUSE: 86 BPM
BASOPHILS # BLD AUTO: 0.1 10E3/UL (ref 0–0.2)
BASOPHILS NFR BLD AUTO: 1 %
BUN SERPL-MCNC: 17 MG/DL (ref 6–20)
CALCIUM SERPL-MCNC: 9.5 MG/DL (ref 8.6–10)
CHLORIDE SERPL-SCNC: 101 MMOL/L (ref 98–107)
CREAT SERPL-MCNC: 0.75 MG/DL (ref 0.51–0.95)
DEPRECATED HCO3 PLAS-SCNC: 24 MMOL/L (ref 22–29)
DIASTOLIC BLOOD PRESSURE - MUSE: NORMAL MMHG
DIASTOLIC BLOOD PRESSURE - MUSE: NORMAL MMHG
EGFRCR SERPLBLD CKD-EPI 2021: >90 ML/MIN/1.73M2
EOSINOPHIL # BLD AUTO: 0.1 10E3/UL (ref 0–0.7)
EOSINOPHIL NFR BLD AUTO: 1 %
ERYTHROCYTE [DISTWIDTH] IN BLOOD BY AUTOMATED COUNT: 13.7 % (ref 10–15)
GLUCOSE SERPL-MCNC: 88 MG/DL (ref 70–99)
HCT VFR BLD AUTO: 38.4 % (ref 35–47)
HGB BLD-MCNC: 12.7 G/DL (ref 11.7–15.7)
HOLD SPECIMEN: NORMAL
HOLD SPECIMEN: NORMAL
IMM GRANULOCYTES # BLD: 0 10E3/UL
IMM GRANULOCYTES NFR BLD: 0 %
INTERPRETATION ECG - MUSE: NORMAL
INTERPRETATION ECG - MUSE: NORMAL
LYMPHOCYTES # BLD AUTO: 1.8 10E3/UL (ref 0.8–5.3)
LYMPHOCYTES NFR BLD AUTO: 18 %
MCH RBC QN AUTO: 30 PG (ref 26.5–33)
MCHC RBC AUTO-ENTMCNC: 33.1 G/DL (ref 31.5–36.5)
MCV RBC AUTO: 91 FL (ref 78–100)
MONOCYTES # BLD AUTO: 0.7 10E3/UL (ref 0–1.3)
MONOCYTES NFR BLD AUTO: 7 %
NEUTROPHILS # BLD AUTO: 7.3 10E3/UL (ref 1.6–8.3)
NEUTROPHILS NFR BLD AUTO: 73 %
NRBC # BLD AUTO: 0 10E3/UL
NRBC BLD AUTO-RTO: 0 /100
NT-PROBNP SERPL-MCNC: 98 PG/ML (ref 0–450)
P AXIS - MUSE: 48 DEGREES
P AXIS - MUSE: 56 DEGREES
PLATELET # BLD AUTO: 262 10E3/UL (ref 150–450)
POTASSIUM SERPL-SCNC: 4.3 MMOL/L (ref 3.4–5.3)
PR INTERVAL - MUSE: 104 MS
PR INTERVAL - MUSE: 122 MS
QRS DURATION - MUSE: 82 MS
QRS DURATION - MUSE: 84 MS
QT - MUSE: 362 MS
QT - MUSE: 378 MS
QTC - MUSE: 433 MS
QTC - MUSE: 433 MS
R AXIS - MUSE: 66 DEGREES
R AXIS - MUSE: 73 DEGREES
RBC # BLD AUTO: 4.24 10E6/UL (ref 3.8–5.2)
SODIUM SERPL-SCNC: 136 MMOL/L (ref 135–145)
SYSTOLIC BLOOD PRESSURE - MUSE: NORMAL MMHG
SYSTOLIC BLOOD PRESSURE - MUSE: NORMAL MMHG
T AXIS - MUSE: 39 DEGREES
T AXIS - MUSE: 40 DEGREES
TROPONIN T SERPL HS-MCNC: <6 NG/L
VENTRICULAR RATE- MUSE: 79 BPM
VENTRICULAR RATE- MUSE: 86 BPM
WBC # BLD AUTO: 9.9 10E3/UL (ref 4–11)

## 2023-11-21 PROCEDURE — 85004 AUTOMATED DIFF WBC COUNT: CPT | Performed by: FAMILY MEDICINE

## 2023-11-21 PROCEDURE — 93005 ELECTROCARDIOGRAM TRACING: CPT | Performed by: FAMILY MEDICINE

## 2023-11-21 PROCEDURE — 96375 TX/PRO/DX INJ NEW DRUG ADDON: CPT | Performed by: FAMILY MEDICINE

## 2023-11-21 PROCEDURE — 71045 X-RAY EXAM CHEST 1 VIEW: CPT | Mod: TC

## 2023-11-21 PROCEDURE — 80048 BASIC METABOLIC PNL TOTAL CA: CPT | Performed by: FAMILY MEDICINE

## 2023-11-21 PROCEDURE — 84484 ASSAY OF TROPONIN QUANT: CPT | Performed by: FAMILY MEDICINE

## 2023-11-21 PROCEDURE — 99285 EMERGENCY DEPT VISIT HI MDM: CPT | Mod: 25 | Performed by: FAMILY MEDICINE

## 2023-11-21 PROCEDURE — 93010 ELECTROCARDIOGRAM REPORT: CPT | Performed by: INTERNAL MEDICINE

## 2023-11-21 PROCEDURE — 250N000013 HC RX MED GY IP 250 OP 250 PS 637: Performed by: FAMILY MEDICINE

## 2023-11-21 PROCEDURE — 96374 THER/PROPH/DIAG INJ IV PUSH: CPT | Performed by: FAMILY MEDICINE

## 2023-11-21 PROCEDURE — 99284 EMERGENCY DEPT VISIT MOD MDM: CPT | Performed by: FAMILY MEDICINE

## 2023-11-21 PROCEDURE — 250N000011 HC RX IP 250 OP 636: Mod: JZ | Performed by: FAMILY MEDICINE

## 2023-11-21 PROCEDURE — 258N000003 HC RX IP 258 OP 636: Performed by: FAMILY MEDICINE

## 2023-11-21 PROCEDURE — 83880 ASSAY OF NATRIURETIC PEPTIDE: CPT | Performed by: FAMILY MEDICINE

## 2023-11-21 PROCEDURE — 36415 COLL VENOUS BLD VENIPUNCTURE: CPT | Performed by: FAMILY MEDICINE

## 2023-11-21 RX ORDER — ONDANSETRON 2 MG/ML
4 INJECTION INTRAMUSCULAR; INTRAVENOUS
Status: DISCONTINUED | OUTPATIENT
Start: 2023-11-21 | End: 2023-11-21 | Stop reason: HOSPADM

## 2023-11-21 RX ORDER — KETOROLAC TROMETHAMINE 30 MG/ML
30 INJECTION, SOLUTION INTRAMUSCULAR; INTRAVENOUS ONCE
Status: COMPLETED | OUTPATIENT
Start: 2023-11-21 | End: 2023-11-21

## 2023-11-21 RX ORDER — SODIUM CHLORIDE 9 MG/ML
INJECTION, SOLUTION INTRAVENOUS CONTINUOUS
Status: DISCONTINUED | OUTPATIENT
Start: 2023-11-21 | End: 2023-11-21 | Stop reason: HOSPADM

## 2023-11-21 RX ORDER — HYDROCODONE BITARTRATE AND ACETAMINOPHEN 5; 325 MG/1; MG/1
1 TABLET ORAL ONCE
Status: COMPLETED | OUTPATIENT
Start: 2023-11-21 | End: 2023-11-21

## 2023-11-21 RX ORDER — HYDROMORPHONE HYDROCHLORIDE 1 MG/ML
0.5 INJECTION, SOLUTION INTRAMUSCULAR; INTRAVENOUS; SUBCUTANEOUS ONCE
Status: COMPLETED | OUTPATIENT
Start: 2023-11-21 | End: 2023-11-21

## 2023-11-21 RX ORDER — ACETAMINOPHEN 10 MG/ML
1000 INJECTION, SOLUTION INTRAVENOUS ONCE
Status: COMPLETED | OUTPATIENT
Start: 2023-11-21 | End: 2023-11-21

## 2023-11-21 RX ORDER — SENNOSIDES 8.6 MG
650 CAPSULE ORAL EVERY 8 HOURS PRN
Qty: 30 TABLET | Refills: 0 | Status: SHIPPED | OUTPATIENT
Start: 2023-11-21

## 2023-11-21 RX ORDER — OXYCODONE HYDROCHLORIDE 5 MG/1
5 CAPSULE ORAL EVERY 4 HOURS PRN
Qty: 10 CAPSULE | Refills: 0 | Status: SHIPPED | OUTPATIENT
Start: 2023-11-21 | End: 2023-11-22

## 2023-11-21 RX ADMIN — ACETAMINOPHEN 1000 MG: 10 INJECTION, SOLUTION INTRAVENOUS at 05:11

## 2023-11-21 RX ADMIN — SODIUM CHLORIDE: 9 INJECTION, SOLUTION INTRAVENOUS at 04:30

## 2023-11-21 RX ADMIN — HYDROCODONE BITARTRATE AND ACETAMINOPHEN 1 TABLET: 5; 325 TABLET ORAL at 06:12

## 2023-11-21 RX ADMIN — HYDROMORPHONE HYDROCHLORIDE 0.5 MG: 1 INJECTION, SOLUTION INTRAMUSCULAR; INTRAVENOUS; SUBCUTANEOUS at 04:38

## 2023-11-21 RX ADMIN — ONDANSETRON 4 MG: 2 INJECTION INTRAMUSCULAR; INTRAVENOUS at 04:33

## 2023-11-21 RX ADMIN — KETOROLAC TROMETHAMINE 30 MG: 30 INJECTION, SOLUTION INTRAMUSCULAR; INTRAVENOUS at 05:09

## 2023-11-21 ASSESSMENT — ACTIVITIES OF DAILY LIVING (ADL)
ADLS_ACUITY_SCORE: 35

## 2023-11-21 ASSESSMENT — ENCOUNTER SYMPTOMS: SHORTNESS OF BREATH: 1

## 2023-11-21 NOTE — ED PROVIDER NOTES
[facility]  Emergency Department Sign Out Note      Transfer of care from Dr. Smallwood. See separate Emergency Department note.    Assessment and Plan:  The patient was evaluated by the previous provider for pleuritic CP secondary to PE.   Pain well controlled. Will discharge with tylenol schedueld and prn oxycodone       Diagnosis  1. Left pulmonary embolus (H)    2. Left-sided chest pain        Disposition:  Home       Carolyn Palacios MD  11/21/23 0911

## 2023-11-21 NOTE — ED TRIAGE NOTES
Pt comes into the ER today c/o left sided chest/neck and arm pain that has worsened throughout the night. She was seen yesterday and dx with PE, discharged home with eliquis, which she has been taking. She hasn't taken anything in addition to the fentanly/toradol given to her in the ER yesterday for the pain. Pain with moving. She is anxious. Short of breath r/t pain     Triage Assessment (Adult)       Row Name 11/21/23 0407          Triage Assessment    Airway WDL WDL        Respiratory WDL    Respiratory WDL X;rhythm/pattern     Rhythm/Pattern, Respiratory shallow;shortness of breath        Skin Circulation/Temperature WDL    Skin Circulation/Temperature WDL WDL        Cardiac WDL    Cardiac WDL chest pain     Cardiac Rhythm NSR        Chest Pain Assessment    Chest Pain Location shoulder, left;arm, left;anterior chest, left     Character sharp     Associated Signs/Symptoms dyspnea     Chest Pain Intervention cardiac monitor placed        Peripheral/Neurovascular WDL    Peripheral Neurovascular WDL WDL        Cognitive/Neuro/Behavioral WDL    Cognitive/Neuro/Behavioral WDL WDL

## 2023-11-21 NOTE — Clinical Note
Ginna Landa was seen and treated in our emergency department on 11/21/2023.  She may return to work on 11/27/2023.       If you have any questions or concerns, please don't hesitate to call.      Carolyn Palacios MD

## 2023-11-21 NOTE — ED PROVIDER NOTES
History     Chief Complaint   Patient presents with    Chest Pain     The history is provided by the patient, a significant other and medical records.     Ginna Landa is a 38 year old female with chest pain who was seen 16 hours ago here for PE. CTA showed subsegmental PE in LLL. She was started on Eliquis. Since that visit she has persistent chest pain in the left lower chest as well as left shoulder pain. She cannot breathe easily, cannot lay down and cannot sleep. She had Toradol and fentanyl here before leaving earlier but it did not help.     Allergies:  Allergies   Allergen Reactions    Amoxicillin      Other reaction(s): Stomach Upset  Stomach upset  Stomach upset    Amoxicillin-Pot Clavulanate      Other reaction(s): Vomiting    Nickel Rash    Sertraline Other (See Comments)     Nightmares, felt hallowed out       Problem List:    Patient Active Problem List    Diagnosis Date Noted    Depo-Provera contraceptive status 02/10/2023     Priority: Medium        Past Medical History:    Past Medical History:   Diagnosis Date    Pregnant state, incidental        Past Surgical History:    Past Surgical History:   Procedure Laterality Date    OTHER SURGICAL HISTORY      2013,[id number],UMBILICAL HERNIA REPAIR    [place] BREAST AUGMENTATION         Family History:    No family history on file.    Social History:  Marital Status:   [4]  Social History     Tobacco Use    Smoking status: Former     Types: Cigarettes     Quit date: 9/29/2013     Years since quitting: 10.1     Passive exposure: Past    Smokeless tobacco: Never   Vaping Use    Vaping Use: Every day    Substances: Nicotine, Flavoring    Devices: Disposable   Substance Use Topics    Alcohol use: Yes     Comment: occ    Drug use: Never     Comment: Drug use: No        Medications:    Apixaban Starter Pack (ELIQUIS DVT/PE STARTER PACK) 5 MG TBPK  Probiotic Product (PROBIOTIC DAILY PO)          Review of Systems   Respiratory:  Positive for shortness of  "breath.    Cardiovascular:  Positive for chest pain.   All other systems reviewed and are negative.      Physical Exam   BP: 102/65  Pulse: 89  Temp: 99.1  F (37.3  C)  Resp: 22  Height: 162.6 cm (5' 4\")  Weight: 56.7 kg (125 lb)  SpO2: 98 %      Physical Exam  Vitals and nursing note reviewed.   Constitutional:       General: She is in acute distress.      Appearance: Normal appearance.       EKG: NSR, rate 79, normal axis.    Results for orders placed or performed during the hospital encounter of 11/21/23 (from the past 24 hour(s))   CBC with platelets differential    Narrative    The following orders were created for panel order CBC with platelets differential.  Procedure                               Abnormality         Status                     ---------                               -----------         ------                     CBC with platelets and d...[[id number]]                      Final result                 Please view results for these tests on the individual orders.   Basic metabolic panel   Result Value Ref Range    Sodium 136 135 - 145 mmol/L    Potassium 4.3 3.4 - 5.3 mmol/L    Chloride 101 98 - 107 mmol/L    Carbon Dioxide (CO2) 24 22 - 29 mmol/L    Anion Gap 11 7 - 15 mmol/L    Urea Nitrogen 17.0 6.0 - 20.0 mg/dL    Creatinine 0.75 0.51 - 0.95 mg/dL    GFR Estimate >90 >60 mL/min/1.73m2    Calcium 9.5 8.6 - 10.0 mg/dL    Glucose 88 70 - 99 mg/dL   Troponin T, High Sensitivity   Result Value Ref Range    Troponin T, High Sensitivity <6 <=14 ng/L   Nt probnp inpatient (BNP)   Result Value Ref Range    N terminal Pro BNP Inpatient 98 0 - 450 pg/mL   CBC with platelets and differential   Result Value Ref Range    WBC Count 9.9 4.0 - 11.0 10e3/uL    RBC Count 4.24 3.80 - 5.20 10e6/uL    Hemoglobin 12.7 11.7 - 15.7 g/dL    Hematocrit 38.4 35.0 - 47.0 %    MCV 91 78 - 100 fL    MCH 30.0 26.5 - 33.0 pg    MCHC 33.1 31.5 - 36.5 g/dL    RDW 13.7 10.0 - 15.0 %    Platelet Count 262 150 - 450 10e3/uL    % " Neutrophils 73 %    % Lymphocytes 18 %    % Monocytes 7 %    % Eosinophils 1 %    % Basophils 1 %    % Immature Granulocytes 0 %    NRBCs per 100 WBC 0 <1 /100    Absolute Neutrophils 7.3 1.6 - 8.3 10e3/uL    Absolute Lymphocytes 1.8 0.8 - 5.3 10e3/uL    Absolute Monocytes 0.7 0.0 - 1.3 10e3/uL    Absolute Eosinophils 0.1 0.0 - 0.7 10e3/uL    Absolute Basophils 0.1 0.0 - 0.2 10e3/uL    Absolute Immature Granulocytes 0.0 <=0.4 10e3/uL    Absolute NRBCs 0.0 10e3/uL   [place] Draw    Narrative    The following orders were created for panel order [place] Draw.  Procedure                               Abnormality         Status                     ---------                               -----------         ------                     Extra Blue Top Tube[[id number]]                              Final result               Extra Red Top Tube[[id number]]                               Final result                 Please view results for these tests on the individual orders.   Extra Blue Top Tube   Result Value Ref Range    Hold Specimen JIC    Extra Red Top Tube   Result Value Ref Range    Hold Specimen JIC        Medications   sodium chloride 0.9 % infusion ( Intravenous $New Bag 11/21/23 0430)   HYDROmorphone (DILAUDID) injection 1 mg (0 mg Intravenous Hold 11/21/23 0437)   ondansetron (ZOFRAN) injection 4 mg (4 mg Intravenous $Given 11/21/23 0433)   HYDROmorphone (PF) (DILAUDID) injection 0.5 mg (0.5 mg Intravenous $Given 11/21/23 0438)   ketorolac (TORADOL) injection 30 mg (30 mg Intravenous $Given 11/21/23 4889)   acetaminophen (OFIRMEV) infusion 1,000 mg (0 mg Intravenous Stopped 11/21/23 0526)   HYDROcodone-acetaminophen (NORCO) 5-325 MG per tablet 1 tablet (1 tablet Oral $Given 11/21/23 0612)       Assessments & Plan (with Medical Decision Making)  Ginna Landa is a 38 year old female with chest pain who was seen 16 hours ago here for PE. CTA showed subsegmental PE in LLL. She was started on Eliquis. Since that  "visit she has persistent chest pain in the left lower chest as well as left shoulder pain. She cannot breathe easily, cannot lay down and cannot sleep. She had Toradol and fentanyl here before leaving earlier but it did not help.   VS in the ED on room air BP 92/58   Pulse 70   Temp 99.1  F (37.3  C) (Tympanic)   Resp 22   Ht 1.626 m (5' 4\")   Wt 56.7 kg (125 lb)   LMP 11/13/2023   SpO2 99%   BMI 21.46 kg/m    Exam shows tearful 37 yo female with chest pain.   EKG stable.  Labs show CBC normal, BMP normal, troponin normal, BNP normal.  Chest xray looks stable to me.  7 AM  I am signing out her care to Dr Palacios at change of shift.      I have reviewed the nursing notes.    I have reviewed the findings, diagnosis, plan and need for follow up with the patient.  Medical Decision Making  The patient's presentation was of high complexity (an acute health issue posing potential threat to life or bodily function).    The patient's evaluation involved:  an assessment requiring an independent historian (see separate area of note for details)  ordering and/or review of 3+ test(s) in this encounter (see separate area of note for details)    The patient's management necessitated moderate risk (prescription drug management including medications given in the ED), high risk (a parenteral controlled substance), and further care after sign-out to Dr Palacios (see their note for further management).      Final diagnoses:   Left pulmonary embolus (H)   Left-sided chest pain       11/21/2023   [facility], Primitivo Henry MD  11/21/23 0655    "

## 2023-11-22 ENCOUNTER — OFFICE VISIT (OUTPATIENT)
Dept: FAMILY MEDICINE | Facility: OTHER | Age: 38
End: 2023-11-22
Attending: PHYSICIAN ASSISTANT
Payer: COMMERCIAL

## 2023-11-22 VITALS
WEIGHT: 125 LBS | SYSTOLIC BLOOD PRESSURE: 120 MMHG | OXYGEN SATURATION: 90 % | HEIGHT: 64 IN | RESPIRATION RATE: 20 BRPM | DIASTOLIC BLOOD PRESSURE: 80 MMHG | BODY MASS INDEX: 21.34 KG/M2

## 2023-11-22 DIAGNOSIS — R07.89 CHEST WALL PAIN: ICD-10-CM

## 2023-11-22 DIAGNOSIS — I26.93 SINGLE SUBSEGMENTAL PULMONARY EMBOLISM WITHOUT ACUTE COR PULMONALE (H): Primary | ICD-10-CM

## 2023-11-22 PROCEDURE — 99214 OFFICE O/P EST MOD 30 MIN: CPT | Performed by: PHYSICIAN ASSISTANT

## 2023-11-22 RX ORDER — LIDOCAINE 50 MG/G
OINTMENT TOPICAL PRN
Qty: 50 G | Refills: 11 | Status: SHIPPED | OUTPATIENT
Start: 2023-11-22

## 2023-11-22 RX ORDER — OXYCODONE HYDROCHLORIDE 5 MG/1
5-10 CAPSULE ORAL EVERY 4 HOURS PRN
Qty: 40 CAPSULE | Refills: 0 | Status: SHIPPED | OUTPATIENT
Start: 2023-11-22 | End: 2023-11-27

## 2023-11-22 RX ORDER — APIXABAN 5 MG/1
5 TABLET, FILM COATED ORAL 2 TIMES DAILY
COMMUNITY
Start: 2023-11-20 | End: 2023-11-22

## 2023-11-22 ASSESSMENT — PAIN SCALES - GENERAL: PAINLEVEL: EXTREME PAIN (8)

## 2023-11-22 NOTE — NURSING NOTE
"Chief Complaint   Patient presents with    Follow Up     ER / pulmonary embolism  11/20/23 / [facility]     Started Sunday. Has been in ER. Continues to have a lot of pain.   Initial /80   Resp 20   Ht 1.626 m (5' 4\")   Wt 56.7 kg (125 lb)   LMP 11/13/2023   SpO2 90%   BMI 21.46 kg/m   Estimated body mass index is 21.46 kg/m  as calculated from the following:    Height as of this encounter: 1.626 m (5' 4\").    Weight as of this encounter: 56.7 kg (125 lb).  Medication Review: complete    The next two questions are to help us understand your food security.  If you are feeling you need any assistance in this area, we have resources available to support you today.          11/22/2023   SDOH- Food Insecurity   Within the past 12 months, did you worry that your food would run out before you got money to buy more? N   Within the past 12 months, did the food you bought just not last and you didn t have money to get more? Y         Health Care Directive:  Patient does not have a Health Care Directive or Living Will: Discussed advance care planning with patient; however, patient declined at this time.    Berta Salazar, FARHEENN      "

## 2023-11-22 NOTE — COMMUNITY RESOURCES LIST (ENGLISH)
11/22/2023   [facility]  N/A  For questions about this resource list or additional care needs, please contact your primary care clinic or care manager.  Phone: [phone]   Email: N/A   Address: [address] [place] [id number]   Hours: N/A        Food and Nutrition       Food pantry  1  [facility] Distance: 2.33 miles      In-Person   [address]  [place] [address]  Language: English  Hours: Mon - Thu 11:00 AM - 3:30 PM  Fees: Free   Phone: (199) 816-7609 Email: info@[employer] Website: https://[employer]     2  [facility] Food Shelf Distance: 14.01 miles      Pick   [address] [place] [address]  Language: English  Hours: Thu 10:00 AM - 1:00 PM  Fees: Free   Phone: (451) 414-3861 Email: ronn@[employer] Website: https://[email or web address]/[employer]RiverAreaFoodShelf/     SNAP application assistance  3  [facility] Distance: 1.48 miles      [address] [place] [address]  Language: English  Hours: Mon - Fri 8:00 AM - 4:30 PM  Fees: Free   Phone: (966) 748-9596 Website: [email or web address]/Health-Human-Services     4  [facility] Distance: 1.49 miles      In-Person, Phone/Virtual   [address] [place] [address]  Language: English  Hours: Mon 8:00 AM - 4:30 PM Appt. Only, Tue - Thu 8:00 AM - 4:30 PM , Fri 8:00 AM - 4:30 PM Appt. Only  Fees: Free   Phone: (219) 148-9441 Website: http://www.[employer].org          Important Numbers & Websites       Emergency Services   911  [facility] Services   311  Poison Control   (366) 299-1720  Suicide Prevention Lifeline   (348) 504-9180 (TALK)  Child Abuse Hotline   (784) 284-7616 (4-A-Child)  Sexual Assault Hotline   (552) 435-8597 (HOPE)  National Runaway Safeline   (785) 310-2016 (RUNAWAY)  All-Options Talkline   (252) 266-5932  Substance Abuse Referral    (825) 769-1735 (HELP)

## 2023-11-22 NOTE — COMMUNITY RESOURCES LIST (ENGLISH)
11/22/2023   [facility]  N/A  For questions about this resource list or additional care needs, please contact your primary care clinic or care manager.  Phone: [phone]   Email: N/A   Address: [address] [place] [id number]   Hours: N/A        Food and Nutrition       Food pantry  1  [facility] Distance: 2.33 miles      In-Person   [address]  [place] [address]  Language: English  Hours: Mon - Thu 11:00 AM - 3:30 PM  Fees: Free   Phone: (493) 403-6121 Email: info@Parature Website: https://Parature     2  [facility] Food Shelf Distance: 14.01 miles      Pick   [address] [place] [address]  Language: English  Hours: Thu 10:00 AM - 1:00 PM  Fees: Free   Phone: (367) 850-6429 Email: [email or web address] Website: https://www.admetricks.com/[employer]helf/     SNAP application assistance  3  [facility] Distance: 1.48 miles      [address] [place] [address]  Language: English  Hours: Mon - Fri 8:00 AM - 4:30 PM  Fees: Free   Phone: (236) 674-7381 Website: [email or web address]/Health-[email or web address]-Services     4  [facility] Distance: 1.49 miles      In-Person, Phone/Virtual   [address] [place] [address]  Language: English  Hours: Mon 8:00 AM - 4:30 PM Appt. Only, Tue - Thu 8:00 AM - 4:30 PM , Fri 8:00 AM - 4:30 PM Appt. Only  Fees: Free   Phone: (666) 793-1850 Website: http://www.[employer].org          Important Numbers & Websites       Emergency Services   911  [facility] Services   311  Poison Control   (123) 920-4456  Suicide Prevention Lifeline   (629) 884-1702 (TALK)  Child Abuse Hotline   (534) 325-6167 (4-A-Child)  Sexual Assault Hotline   (948) 685-4890 (HOPE)  National Runaway Safeline   (868) 846-2625 (RUNAWAY)  All-Options Talkline   (697) 332-7539  Substance Abuse Referral    (836) 421-2658 (HELP)

## 2023-11-22 NOTE — PROGRESS NOTES
Assessment & Plan   Problem List Items Addressed This Visit    None  Visit Diagnoses       Single subsegmental pulmonary embolism without acute cor pulmonale (H)    -  Primary    Relevant Medications    apixaban ANTICOAGULANT (ELIQUIS) 5 MG tablet    oxyCODONE (OXY-IR) 5 MG capsule    lidocaine (XYLOCAINE) 5 % external ointment    Other Relevant Orders    Adult Oncology/Hematology  Referral    Chest wall pain        Relevant Medications    lidocaine (XYLOCAINE) 5 % external ointment           Pulmonary embolism: Continue Eliquis as previously prescribed.  Refill medication.  Discussed pain concerns at length.  Sent a lidocaine ointment to the pharmacy to use as needed for symptomatic relief.  Sent a refill of oxycodone to use as needed for severe pain to the pharmacy.  Increase to 10 mg every 4 hours as needed.  Encouraged to slowly decrease the dose over the next few days as tolerated.  Gave warning signs and symptoms.  Encourage close follow-up in clinic or the emergency room if symptoms are changing or worsening.  Refer to hematology for consult.    One-time refill of oxycodone was given to use as needed for severe pain.  Patient was given the side effect profile and the safety concerns with using the controlled medication prescription.   Patient should not share the controlled medication with other people.    website was reviewed.     PDMP Review         Value Time User    State PDMP site checked  Yes 11/22/2023  2:39 PM Sofy Bolivar PA-C          Recheck appointment on 11/27/2023 for monitoring.    Prescription drug management  33 minutes spent by me on the date of the encounter doing chart review, history and exam, documentation and further activities per the note       See Patient Instructions    No follow-ups on file.    Sofy Bolivar PA-C  [facility]    Jose Chacko is a 38 year old, presenting for the following health issues:  Follow Up (ER / pulmonary  embolism  11/20/23 / [facility])        11/22/2023     2:24 PM   Additional Questions   Roomed by Jeni BRAXTON LPN   Accompanied by self       History of Present Illness       Reason for visit:  Follow up from ER visits    She eats 0-1 servings of fruits and vegetables daily.She consumes 1 sweetened beverage(s) daily.She exercises with enough effort to increase her heart rate 10 to 19 minutes per day.  She exercises with enough effort to increase her heart rate 3 or less days per week.   She is taking medications regularly.     Patient was seen recently in the emergency room on 11/20/2023.  Initially was seen with chest pain that started 1 day prior to arrival.  It was sharp and worse with deep inspiration.  Radiated from her lower left chest wall to the left shoulder.  Patient was started on Xulane patch in September for birth control.  Testing was completed which showed a significantly elevated D-dimer.  CTA was performed which did show subsegmental PE in the left lower lobe.  Patient was started on Eliquis.  Patient was discontinued from her birth control.  Recommended to use condoms.  Patient was discharged with Tylenol and as needed oxycodone for discomfort.    Patient is coming in today for ER follow-up.  She is having a very hard time sleeping with the high amount of pain.  They gave her Tylenol and oxycodone 5 mg which she has been rotating every 4 hours.  They are not helping.  Cannot tie her shoes without a sharp pain radiating across her neck, chest, and down her arm.  Cannot lay down or sit a certain way.  Hard time even being semireclined due to the left chest wall pain.  Pain is present on the left lateral chest, left upper shoulder, left upper back, and down her left arm.  Currently taking Eliquis with no side effects.  In a lot of pain and exhausted.  Increased anxiety with everything going on.  Hard time breathing and sleeping.  Unsure if lidocaine would help.  Pain with certain positions.  Has a stabbing  "pain.  Symptoms started on 11/19.  Did not sleep that night.  Went to the emergency room the next day and was diagnosed with a left lower PE.  Was given Toradol which did not help.  Having a harder time breathing.  Tried medications in the emergency room including Dilaudid which just made her sleep for 10 minutes.  Was given Tylenol and oxycodone in the emergency room.  Hard time getting comfortable.  Last took oxycodone 2 hours ago and she is still in a lot of pain.  More short of breath.  Hard time talking and communicating to her children as she feels out of breath.  Feels like she has a claudia going up and down her left chest and across her side.  No palpitations, fevers, chills, GI or urinary symptoms.    Review of Systems   Constitutional, HEENT, cardiovascular, pulmonary, gi and gu systems are negative, except as otherwise noted.      Objective    /80   Resp 20   Ht 1.626 m (5' 4\")   Wt 56.7 kg (125 lb)   LMP 11/13/2023   SpO2 90%   BMI 21.46 kg/m    Body mass index is 21.46 kg/m .  Physical Exam  Vitals and nursing note reviewed.   Constitutional:       Appearance: Normal appearance.   HENT:      Head: Normocephalic and atraumatic.   Eyes:      Extraocular Movements: Extraocular movements intact.      Conjunctiva/sclera: Conjunctivae normal.   Cardiovascular:      Rate and Rhythm: Normal rate and regular rhythm.      Heart sounds: Normal heart sounds.   Pulmonary:      Effort: Pulmonary effort is normal.      Breath sounds: Normal breath sounds.   Musculoskeletal:         General: No swelling or signs of injury. Normal range of motion.      Cervical back: Normal range of motion.      Comments: Mild pain to palpation over the left lateral and anterior lower chest wall.  No bruising or swelling appreciated.   Skin:     General: Skin is warm and dry.   Neurological:      General: No focal deficit present.      Mental Status: She is alert and oriented to person, place, and time.      Motor: No weakness. "      Coordination: Coordination normal.      Gait: Gait normal.      Deep Tendon Reflexes: Reflexes normal.   Psychiatric:         Mood and Affect: Mood normal.         Behavior: Behavior normal.

## 2023-11-27 ENCOUNTER — OFFICE VISIT (OUTPATIENT)
Dept: FAMILY MEDICINE | Facility: OTHER | Age: 38
End: 2023-11-27
Attending: PHYSICIAN ASSISTANT
Payer: COMMERCIAL

## 2023-11-27 ENCOUNTER — PATIENT OUTREACH (OUTPATIENT)
Dept: ONCOLOGY | Facility: OTHER | Age: 38
End: 2023-11-27

## 2023-11-27 VITALS
BODY MASS INDEX: 20.18 KG/M2 | OXYGEN SATURATION: 95 % | HEIGHT: 64 IN | SYSTOLIC BLOOD PRESSURE: 118 MMHG | WEIGHT: 118.2 LBS | RESPIRATION RATE: 20 BRPM | HEART RATE: 92 BPM | TEMPERATURE: 98.7 F | DIASTOLIC BLOOD PRESSURE: 60 MMHG

## 2023-11-27 DIAGNOSIS — M62.838 MUSCLE SPASM: ICD-10-CM

## 2023-11-27 DIAGNOSIS — Z30.09 BIRTH CONTROL COUNSELING: Primary | ICD-10-CM

## 2023-11-27 DIAGNOSIS — I26.93 SINGLE SUBSEGMENTAL PULMONARY EMBOLISM WITHOUT ACUTE COR PULMONALE (H): ICD-10-CM

## 2023-11-27 PROCEDURE — 99213 OFFICE O/P EST LOW 20 MIN: CPT | Performed by: PHYSICIAN ASSISTANT

## 2023-11-27 RX ORDER — CYCLOBENZAPRINE HCL 10 MG
5-10 TABLET ORAL 3 TIMES DAILY PRN
Qty: 30 TABLET | Refills: 3 | Status: SHIPPED | OUTPATIENT
Start: 2023-11-27

## 2023-11-27 RX ORDER — OXYCODONE HYDROCHLORIDE 5 MG/1
5 CAPSULE ORAL EVERY 4 HOURS PRN
Qty: 30 CAPSULE | Refills: 0 | Status: SHIPPED | OUTPATIENT
Start: 2023-11-27

## 2023-11-27 ASSESSMENT — PAIN SCALES - GENERAL: PAINLEVEL: MODERATE PAIN (4)

## 2023-11-27 NOTE — PROGRESS NOTES
Oncology/Hematology Care Coordination - Referral Review    Referred by:  Mary Bolivar    Diagnosis:  PE    Most recent Imaging:  Ct PE study 11/20/23    Most recent Lab:  11/20,11/21    Surgery/Biopsy:      Pathology:      Outside Records:      On Eliquis.    Laxmi Cullen RN on 11/27/2023 at 1:25 PM

## 2023-11-27 NOTE — PATIENT INSTRUCTIONS
Encouraged to take tylenol for relief up to 4 times per day.  Encouraged rest and elevation.  Encouraged to use ice or heat 15 minutes at a time several times per day to decrease pain. Return to clinic in 1-2 weeks as necessary for persistent pain. Return to clinic with any change or worsening of symptoms.

## 2023-11-27 NOTE — PROGRESS NOTES
Assessment & Plan   Problem List Items Addressed This Visit    None  Visit Diagnoses       Birth control counseling    -  Primary    Relevant Orders    Ob/Gyn  Referral    Single subsegmental pulmonary embolism without acute cor pulmonale (H)        Relevant Medications    cyclobenzaprine (FLEXERIL) 10 MG tablet    oxyCODONE (OXY-IR) 5 MG capsule    Muscle spasm        Relevant Medications    cyclobenzaprine (FLEXERIL) 10 MG tablet           Birth control counseling: Referred to OB/GYN for consult to discuss birth control options with her recent pulmonary embolism.    Pulmonary embolism: Vital signs are stable.  Patient is currently stable and slowly improving.  Patient was given Flexeril to use as needed for muscle spasms and tension.  Patient was given a one-time refill of the oxycodone 5 mg capsules to take 1 every 4 hours as needed for severe pain.  Gave oxycodone 5 mg capsules quantity 30 with 0 refills.    Patient was given a one time refill of the narcotic medication to use prior to having a medication management appointment with the primary care provider.   Patient was given the side effect profile and the safety concerns with using the controlled medication prescription.   Patient should not share the controlled medication with other people.    website was reviewed.     PDMP Review         Value Time User    State PDMP site checked  Yes 11/27/2023  8:46 AM Sofy Bolivar PA-C          Patient has already received hematology/oncology referral.  Encouraged to set up this appointment at her convenience.    Prescription drug management       See Patient Instructions    Return if symptoms worsen or fail to improve.    Sofy Bolivar PA-C  [facility]    Jose Chacko is a 38 year old, presenting for the following health issues:  Breathing Problem        11/27/2023     8:25 AM   Additional Questions   Roomed by Jeni BRAXTON LPN   Accompanied by self       History of  "Present Illness       Reason for visit:  Follow up from ER visits    She eats 0-1 servings of fruits and vegetables daily.She consumes 1 sweetened beverage(s) daily.She exercises with enough effort to increase her heart rate 10 to 19 minutes per day.  She exercises with enough effort to increase her heart rate 3 or less days per week.   She is taking medications regularly.     Patient is coming in today for pulmonary embolism follow-up.  She states that her pain has been stable.  Still having a lot of pain on the left chest however breathing is doing okay.  Her symptoms worsen with talking, increased emotions, and movement.  She has been alternating between Tylenol and oxycodone.  Symptoms are better if she feels calm with the pain medication.  The left chest pain worsens with bending over at a certain angle especially the left shoulder and left chest.  Able to sleep last few nights with the pain medication.  Was nauseous yesterday.  Has been having to take oxycodone 10 mg at a time.  No palpitations, racing heart, fevers, chills, coughing up blood.  Has not set up her hematology oncology appointment yet.    Patient did have a menstrual cycle this past week.  Finished 2 days ago.  She had to stop the birth control patch in the ER after getting diagnosed with the pulmonary embolism.    Review of Systems   Constitutional, HEENT, cardiovascular, pulmonary, gi and gu systems are negative, except as otherwise noted.      Objective    /60   Pulse 92   Temp 98.7  F (37.1  C) (Tympanic)   Resp 20   Ht 1.626 m (5' 4\")   Wt 53.6 kg (118 lb 3.2 oz)   LMP 11/13/2023   SpO2 95%   BMI 20.29 kg/m    Body mass index is 20.29 kg/m .  Physical Exam  Vitals and nursing note reviewed.   Constitutional:       Appearance: Normal appearance.   HENT:      Head: Normocephalic and atraumatic.   Eyes:      Extraocular Movements: Extraocular movements intact.      Conjunctiva/sclera: Conjunctivae normal.   Cardiovascular:      " Rate and Rhythm: Normal rate and regular rhythm.      Heart sounds: Normal heart sounds.   Pulmonary:      Effort: Pulmonary effort is normal.      Breath sounds: Normal breath sounds.   Musculoskeletal:         General: Normal range of motion.      Cervical back: Normal range of motion.   Lymphadenopathy:      Cervical: No cervical adenopathy.   Skin:     General: Skin is warm and dry.   Neurological:      General: No focal deficit present.      Mental Status: She is alert and oriented to person, place, and time.   Psychiatric:         Mood and Affect: Mood normal.         Behavior: Behavior normal.

## 2023-11-27 NOTE — NURSING NOTE
"Chief Complaint   Patient presents with    Breathing Problem     Follow up from pulmonary issue.   Initial /60   Pulse 108   Temp 98.7  F (37.1  C) (Tympanic)   Resp 20   Ht 1.626 m (5' 4\")   Wt 53.6 kg (118 lb 3.2 oz)   LMP 11/13/2023   SpO2 95%   BMI 20.29 kg/m   Estimated body mass index is 20.29 kg/m  as calculated from the following:    Height as of this encounter: 1.626 m (5' 4\").    Weight as of this encounter: 53.6 kg (118 lb 3.2 oz).  Medication Review: complete    The next two questions are to help us understand your food security.  If you are feeling you need any assistance in this area, we have resources available to support you today.          11/22/2023   SDOH- Food Insecurity   Within the past 12 months, did you worry that your food would run out before you got money to buy more? N   Within the past 12 months, did the food you bought just not last and you didn t have money to get more? Y         Health Care Directive:  Patient does not have a Health Care Directive or Living Will: Discussed advance care planning with patient; however, patient declined at this time.    Berta Salazar, LPN      "

## 2023-11-28 ENCOUNTER — OFFICE VISIT (OUTPATIENT)
Dept: OBGYN | Facility: OTHER | Age: 38
End: 2023-11-28
Attending: PHYSICIAN ASSISTANT
Payer: COMMERCIAL

## 2023-11-28 VITALS
BODY MASS INDEX: 20.65 KG/M2 | DIASTOLIC BLOOD PRESSURE: 60 MMHG | WEIGHT: 120.3 LBS | HEART RATE: 84 BPM | SYSTOLIC BLOOD PRESSURE: 112 MMHG

## 2023-11-28 DIAGNOSIS — Z30.09 BIRTH CONTROL COUNSELING: ICD-10-CM

## 2023-11-28 DIAGNOSIS — Z86.711 HISTORY OF PULMONARY EMBOLISM: Primary | ICD-10-CM

## 2023-11-28 PROCEDURE — 250N000011 HC RX IP 250 OP 636: Mod: JZ | Performed by: OBSTETRICS & GYNECOLOGY

## 2023-11-28 PROCEDURE — 96372 THER/PROPH/DIAG INJ SC/IM: CPT | Performed by: OBSTETRICS & GYNECOLOGY

## 2023-11-28 PROCEDURE — 99213 OFFICE O/P EST LOW 20 MIN: CPT | Performed by: OBSTETRICS & GYNECOLOGY

## 2023-11-28 RX ORDER — MEDROXYPROGESTERONE ACETATE 150 MG/ML
150 INJECTION, SUSPENSION INTRAMUSCULAR
Qty: 1 ML | Refills: 3 | OUTPATIENT
Start: 2023-11-28

## 2023-11-28 RX ORDER — MEDROXYPROGESTERONE ACETATE 150 MG/ML
150 INJECTION, SUSPENSION INTRAMUSCULAR
Status: ACTIVE | OUTPATIENT
Start: 2023-11-28

## 2023-11-28 RX ADMIN — MEDROXYPROGESTERONE ACETATE 150 MG: 150 INJECTION, SUSPENSION INTRAMUSCULAR at 14:09

## 2023-11-28 NOTE — NURSING NOTE
Pt presents to clinic today for consult on birth control.      Medication Reconciliation: complete  Marina Barajas LPN    Clinic Administered Medication Documentation        Patient was given Depo Provera. Prior to medication administration, verified patient's identity using patient s name and date of birth. Please see MAR and medication order for additional information. Patient instructed to remain in clinic for 15 minutes and report any adverse reaction to staff immediately.    Vial/Syringe: Single dose vial. Was entire vial of medication used? Yes    NEXT INJECTION DUE: 2/13/24 - 3/12/24

## 2023-11-28 NOTE — PROGRESS NOTES
CC: Birth control following a pulmonary embolus  HPI:  Ginna is a 38 year old female who presents for discussion of birth control options following a PE last week. She was told it was from being on her estrogen containing Ortho Evra. She is off of Ortho Evra now and is using Eloquis for anticoagulation. She has no other concerns. She has used IUD's in the past and had pain and dyspareunia with Mirena and Paragard. She had a good track record with Depo Provera in the past.  Patient's last menstrual period was 2023.      Pap Smears: due in May  Mammograms: n/a    OB History    Para Term  AB Living   5 0 0 0 2 3   SAB IAB Ectopic Multiple Live Births   0 0 0 0 0      # Outcome Date GA Lbr Liam/2nd Weight Sex Delivery Anes PTL Lv   5       Vag-Spont      4       Vag-Spont      3       Vag-Spont      2 AB            1 AB              Past Medical History:   Diagnosis Date    Pregnant state, incidental          Past Surgical History:   Procedure Laterality Date    OTHER SURGICAL HISTORY      ,[id number],UMBILICAL HERNIA REPAIR    NM BREAST AUGMENTATION       Social History     Socioeconomic History    Marital status:      Spouse name: Laci    Number of children: Not on file    Years of education: Not on file    Highest education level: Not on file   Occupational History    Not on file   Tobacco Use    Smoking status: Former     Types: Cigarettes     Quit date: 2013     Years since quitting: 10.1     Passive exposure: Past    Smokeless tobacco: Never   Vaping Use    Vaping Use: Every day    Substances: Nicotine, Flavoring    Devices: Disposable   Substance and Sexual Activity    Alcohol use: Yes     Comment: occ    Drug use: Never     Comment: Drug use: No    Sexual activity: Yes     Partners: Male     Birth control/protection: Surgical, Surgical     Comment: Birth control method: vas   Other Topics Concern    Not on file   Social History Narrative    .  VB       Social Determinants of Health     Financial Resource Strain: Low Risk  (11/22/2023)    Financial Resource Strain     Within the past 12 months, have you or your family members you live with been unable to get utilities (heat, electricity) when it was really needed?: No   Food Insecurity: High Risk (11/22/2023)    Food Insecurity     Within the past 12 months, did you worry that your food would run out before you got money to buy more?: No     Within the past 12 months, did the food you bought just not last and you didn t have money to get more?: Yes   Transportation Needs: Low Risk  (11/22/2023)    Transportation Needs     Within the past 12 months, has lack of transportation kept you from medical appointments, getting your medicines, non-medical meetings or appointments, work, or from getting things that you need?: No   Physical Activity: Not on file   Stress: Not on file   Social Connections: Not on file   Interpersonal Safety: Low Risk  (11/22/2023)    Interpersonal Safety     Do you feel physically and emotionally safe where you currently live?: Yes     Within the past 12 months, have you been hit, slapped, kicked or otherwise physically hurt by someone?: No     Within the past 12 months, have you been humiliated or emotionally abused in other ways by your partner or ex-partner?: No   Housing Stability: Low Risk  (11/22/2023)    Housing Stability     Do you have housing? : Yes     Are you worried about losing your housing?: No     History reviewed. No pertinent family history.    Current Outpatient Medications   Medication    acetaminophen (TYLENOL 8 HOUR) 650 MG CR tablet    apixaban ANTICOAGULANT (ELIQUIS) 5 MG tablet    Apixaban Starter Pack (ELIQUIS DVT/PE STARTER PACK) 5 MG TBPK    oxyCODONE (OXY-IR) 5 MG capsule    cyclobenzaprine (FLEXERIL) 10 MG tablet    lidocaine (XYLOCAINE) 5 % external ointment    Probiotic Product (PROBIOTIC DAILY PO)     Current Facility-Administered Medications    Medication    medroxyPROGESTERone (DEPO-PROVERA) injection 150 mg     Allergies   Allergen Reactions    Amoxicillin      Other reaction(s): Stomach Upset  Stomach upset  Stomach upset    Amoxicillin-Pot Clavulanate      Other reaction(s): Vomiting    Nickel Rash    Sertraline Other (See Comments)     Nightmares, felt hallowed out     /60   Pulse 84   Wt 54.6 kg (120 lb 4.8 oz)   LMP 11/13/2023   BMI 20.65 kg/m      REVIEW OF SYSTEMS  General: negative    Exam:  Constitutional: healthy, alert, active, and no distress      Lab: No results found for any visits on 11/28/23.    ASSESSMENT/PLAN :  1. History of pulmonary embolism    2. Birth control counseling      (Z86.711) History of pulmonary embolism  (primary encounter diagnosis)  Comment:   Plan: medroxyPROGESTERone (DEPO-PROVERA) injection         150 mg            (Z30.09) Birth control counseling  Comment:   Plan: medroxyPROGESTERone (DEPO-PROVERA) injection         150 mg          She is following with Hematology next week. Would recommend US of her legs to rule out PE from DVT. If she were to have May-Thurner or an anatomic cause she may require life long anticoagulation.    Camden Freitas MD FACOG  2:06 PM 11/28/2023

## 2023-12-08 ENCOUNTER — ONCOLOGY VISIT (OUTPATIENT)
Dept: ONCOLOGY | Facility: OTHER | Age: 38
End: 2023-12-08
Attending: PHYSICIAN ASSISTANT
Payer: COMMERCIAL

## 2023-12-08 VITALS
BODY MASS INDEX: 19.91 KG/M2 | HEART RATE: 95 BPM | OXYGEN SATURATION: 99 % | HEIGHT: 64 IN | RESPIRATION RATE: 16 BRPM | TEMPERATURE: 99.4 F | WEIGHT: 116.6 LBS | SYSTOLIC BLOOD PRESSURE: 116 MMHG | DIASTOLIC BLOOD PRESSURE: 52 MMHG

## 2023-12-08 DIAGNOSIS — I26.93 SINGLE SUBSEGMENTAL PULMONARY EMBOLISM WITHOUT ACUTE COR PULMONALE (H): ICD-10-CM

## 2023-12-08 LAB
FACTOR 2 INTERPRETATION: ABNORMAL
FACTOR V INTERPRETATION: ABNORMAL
LAB DIRECTOR COMMENTS: ABNORMAL
LAB DIRECTOR DISCLAIMER: ABNORMAL
LAB DIRECTOR INTERPRETATION: ABNORMAL
LAB DIRECTOR METHODOLOGY: ABNORMAL
LAB DIRECTOR RESULTS: ABNORMAL
SPECIMEN DESCRIPTION: ABNORMAL

## 2023-12-08 PROCEDURE — 85300 ANTITHROMBIN III ACTIVITY: CPT | Mod: ZL | Performed by: INTERNAL MEDICINE

## 2023-12-08 PROCEDURE — 99205 OFFICE O/P NEW HI 60 MIN: CPT | Performed by: INTERNAL MEDICINE

## 2023-12-08 PROCEDURE — 36415 COLL VENOUS BLD VENIPUNCTURE: CPT | Mod: ZL | Performed by: INTERNAL MEDICINE

## 2023-12-08 PROCEDURE — 86146 BETA-2 GLYCOPROTEIN ANTIBODY: CPT | Mod: ZL | Performed by: INTERNAL MEDICINE

## 2023-12-08 PROCEDURE — 86147 CARDIOLIPIN ANTIBODY EA IG: CPT | Mod: ZL | Performed by: INTERNAL MEDICINE

## 2023-12-08 PROCEDURE — 81240 F2 GENE: CPT | Mod: ZL | Performed by: INTERNAL MEDICINE

## 2023-12-08 PROCEDURE — 85306 CLOT INHIBIT PROT S FREE: CPT | Mod: ZL | Performed by: INTERNAL MEDICINE

## 2023-12-08 PROCEDURE — 85303 CLOT INHIBIT PROT C ACTIVITY: CPT | Mod: ZL | Performed by: INTERNAL MEDICINE

## 2023-12-08 PROCEDURE — G0452 MOLECULAR PATHOLOGY INTERPR: HCPCS | Mod: 26 | Performed by: STUDENT IN AN ORGANIZED HEALTH CARE EDUCATION/TRAINING PROGRAM

## 2023-12-08 ASSESSMENT — PAIN SCALES - GENERAL: PAINLEVEL: NO PAIN (0)

## 2023-12-08 NOTE — PROGRESS NOTES
HEMATOLOGY CONSULT NOTE  Dec 8, 2023    Reason for consult: Pulmonary Embolism.     Referring Provider: Sofy Bolivar PA-C    HISTORY OF PRESENT ILLNESS  Ginna Landa is a 38 year old female with PMH as stated below who is seen in the hematology clinic for PE.     Her history in short is as follows:    Ms. Landa developed shortness of breath and left sided chest pain prior to presentation to the ED.     11/20/2023: CT chest Pulmonary Embolism with contrast: Positive for pulmonary embolus. Small filling defect is seen within the segmental and subsegmental branches involving the lateral segment of the left lower lobe.Small left-sided pleural effusion with the peripheral areas of ground glass opacification involving the left lower lobe.    She was started on Eliquis. Tolerating treatment well. She denies being sick prior to her presentation. Denies any surgery or prolonged traveled. She was on Xulane patches at the time of diagnosis which she had started in September.     Denies family history of VTE and autoimmune disorder and pregnancy losses. She has 2 first trimester miscarriage, 3 full term pregnancies her normal. She did have subchorionic hematoma in one pregnancy.     She continues to have pain on left side of chest and shoulder. Continues to have some shortness of breath but states could be from anxiety.     REVIEW OF SYSTEMS  A 12-point ROS negative except as in HPI      Current Outpatient Medications   Medication Sig Dispense Refill    acetaminophen (TYLENOL 8 HOUR) 650 MG CR tablet Take 1 tablet (650 mg) by mouth every 8 hours as needed for mild pain or fever 30 tablet 0    apixaban ANTICOAGULANT (ELIQUIS) 5 MG tablet Take 1 tablet (5 mg) by mouth 2 times daily 60 tablet 1    Apixaban Starter Pack (ELIQUIS DVT/PE STARTER PACK) 5 MG TBPK Take 10 mg by mouth 2 times daily for 7 days, THEN 5 mg 2 times daily for 23 days. 74 each 0    cyclobenzaprine (FLEXERIL) 10 MG tablet Take 0.5-1 tablets (5-10 mg) by  mouth 3 times daily as needed for muscle spasms (Patient not taking: Reported on 2023) 30 tablet 3    lidocaine (XYLOCAINE) 5 % external ointment Apply topically as needed for moderate pain (Patient not taking: Reported on 2023) 50 g 11    medroxyPROGESTERone (DEPO-PROVERA) 150 MG/ML IM injection Inject 1 mL (150 mg) into the muscle every 3 months 1 mL 3    oxyCODONE (OXY-IR) 5 MG capsule Take 1 capsule (5 mg) by mouth every 4 hours as needed for severe pain 30 capsule 0    Probiotic Product (PROBIOTIC DAILY PO)  (Patient not taking: Reported on 2023)         Allergies   Allergen Reactions    Amoxicillin      Other reaction(s): Stomach Upset  Stomach upset  Stomach upset    Amoxicillin-Pot Clavulanate      Other reaction(s): Vomiting    Nickel Rash    Sertraline Other (See Comments)     Nightmares, felt hallowed out     Immunization History   Administered Date(s) Administered    COVID-19 Monovalent 18+ (Moderna) 2021, 2021, 11/10/2021    HepB, Unspecified 1998, 1999, 1999, 1999    Hepatitis A (ADULT 19+) 03/10/2023    Historical DTP/aP 1987, 1988, 1989, 1989    MMR 1989, 1999    OPV, trivalent, live 1987, 1988, 1989    Td (Adult), Adsorbed 1999       Past Medical History:   Diagnosis Date    Pregnant state, incidental            Past Surgical History:   Procedure Laterality Date    OTHER SURGICAL HISTORY      2013,[id number],UMBILICAL HERNIA REPAIR    [place] BREAST AUGMENTATION         SOCIAL HISTORY  History   Smoking Status    Former    Types: Cigarettes    Quit date: 2013   Smokeless Tobacco    Never    Social History    Substance and Sexual Activity      Alcohol use: Yes        Comment: occ     History   Drug Use Unknown     Comment: Drug use: No       FAMILY HISTORY  Follicular lymphoma in Father.     PHYSICAL EXAMINATION  /52   Pulse 95   Temp 99.4  F (37.4  C) (Tympanic)   Resp 16   Ht  "1.626 m (5' 4\")   Wt 52.9 kg (116 lb 9.6 oz)   LMP 11/13/2023   SpO2 99%   BMI 20.01 kg/m    Wt Readings from Last 2 Encounters:   12/08/23 52.9 kg (116 lb 9.6 oz)   11/28/23 54.6 kg (120 lb 4.8 oz)     Physical Exam  Constitutional:       Appearance: Normal appearance.   Pulmonary:      Effort: Pulmonary effort is normal.   Neurological:      General: No focal deficit present.      Mental Status: She is alert and oriented to person, place, and time.   Psychiatric:         Mood and Affect: Mood normal.       Laboratory and Imaging:     Latest Reference Range & Units 11/21/23 04:20   WBC 4.0 - 11.0 10e3/uL 9.9   Hemoglobin 11.7 - 15.7 g/dL 12.7   Hematocrit 35.0 - 47.0 % 38.4   Platelet Count 150 - 450 10e3/uL 262     ASSESSMENT AND PLAN    Pulmonary Embolism:    Diagnosed with pulmonary meeting embolism 11/20/2023.  Initial presented with left-sided chest pain and pain in the shoulder.  CT pulmonary protocol did show segmental and subsegmental involving lateral segments of left lower lobe.  Also was found to have left lower lobe opacity with small pulmonary effusion.  Based on her symptoms and imaging this is very likely an evolving pulmonary infarct.    She was started then on Eliquis which she is tolerating well.  She was on Xualane patch which was started in September 2023.  As this is a estrogen containing transdermal patch it is possible that the pulmonary embolism was secondary to that.  She is currently on Depo-Provera.  She has previously tried Mirena IUD and has had pain secondary to that.    Discussed I would recommend a hypercoagulable workup to identify if she has an underlying thrombophilia which resulted in a blood clot from being on birth control patch.  Will check for protein C, protein S, Antithrombin III, factor II and factor V.  Will also check for anticardiolipin and beta-2 glycoprotein.  Unable to check lupus anticoagulant currently as she is on anticoagulation.    Discussed overall if her " hypercoagulable workup was overall negative then I would recommend 6 months of anticoagulation.  Following which she can stop.  Discussed that there is some risk of thrombosis even with Depo-Provera however it is not as high as with estrogen containing birth control.  However she has tried many different types of hormonal contraceptives and has had pain with IUDs.    Will recommend follow-up in 5 months to reassess before we stop anticoagulation.    Total time spent on the patient on day of encounter was 70 minutes doing chart review, review of test results, interpretation of results, patient visit and documentation.       Jadyn Simeon MD

## 2023-12-08 NOTE — NURSING NOTE
Chief Complaint   Patient presents with    Consult     PE        Medication Reconciliation: complete    Natalya Kennedy LPN on 12/8/2023 at 9:00 AM

## 2023-12-11 LAB
AT III ACT/NOR PPP CHRO: 114 % (ref 85–135)
B2 GLYCOPROT1 IGG SERPL IA-ACNC: 3.2 U/ML
B2 GLYCOPROT1 IGM SERPL IA-ACNC: 3.4 U/ML
CARDIOLIPIN IGG SER IA-ACNC: 2.2 GPL-U/ML
CARDIOLIPIN IGG SER IA-ACNC: NEGATIVE
CARDIOLIPIN IGM SER IA-ACNC: 5 MPL-U/ML
CARDIOLIPIN IGM SER IA-ACNC: NEGATIVE
PROT C ACT/NOR PPP CHRO: 107 % (ref 70–170)
PROT S FREE AG ACT/NOR PPP IA: 101 % (ref 55–125)

## 2024-02-26 ENCOUNTER — ALLIED HEALTH/NURSE VISIT (OUTPATIENT)
Dept: FAMILY MEDICINE | Facility: OTHER | Age: 39
End: 2024-02-26
Payer: COMMERCIAL

## 2024-02-26 DIAGNOSIS — Z30.42 ENCOUNTER FOR DEPO-PROVERA CONTRACEPTION: Primary | ICD-10-CM

## 2024-02-26 PROCEDURE — 250N000011 HC RX IP 250 OP 636: Mod: JZ | Performed by: OBSTETRICS & GYNECOLOGY

## 2024-02-26 PROCEDURE — 96372 THER/PROPH/DIAG INJ SC/IM: CPT | Performed by: OBSTETRICS & GYNECOLOGY

## 2024-02-26 RX ADMIN — MEDROXYPROGESTERONE ACETATE 150 MG: 150 INJECTION, SUSPENSION INTRAMUSCULAR at 16:10

## 2024-02-26 NOTE — PROGRESS NOTES
Clinic Administered Medication Documentation    Depo Provera Documentation    Depo-Provera Standing Order inclusion/exclusion criteria reviewed.     Is this the initial or subsequent dose of Depo Provera? Subsequent dose - patient is within the acceptable window of time (11-15 weeks) for subsequent injection. Pregnancy test not indicated.    Patient meets: inclusion criteria     Is there an active order (written within the past 365 days, with administrations remaining, not ) in the chart? Yes.     Prior to injection, verified patient identity using patient's name and date of birth. Medication was administered. Please see MAR and medication order for additional information.     Vial/Syringe: Single dose vial. Was entire vial of medication used? Yes    Patient instructed to remain in clinic for 15 minutes and report any adverse reaction to staff immediately but patient declined.  NEXT INJECTION DUE: 24 - 6/10/24    BARBARA KENT RN on 2024 at 4:10 PM

## 2024-05-07 ENCOUNTER — TELEPHONE (OUTPATIENT)
Dept: ONCOLOGY | Facility: OTHER | Age: 39
End: 2024-05-07
Payer: COMMERCIAL

## 2024-05-07 NOTE — TELEPHONE ENCOUNTER
Phone call to our scheduling desk in response to a call we placed to get her set up for her follow up appointment.  She refused to schedule a follow up appointment with Dr Simeon and does not want to be contacted again.  States she is feeling fine and doesn't need it.  She will call if she wants to come in.  Will route to Dr Simeon as an FYI.

## 2024-05-20 ENCOUNTER — ALLIED HEALTH/NURSE VISIT (OUTPATIENT)
Dept: FAMILY MEDICINE | Facility: OTHER | Age: 39
End: 2024-05-20
Payer: COMMERCIAL

## 2024-05-20 VITALS — DIASTOLIC BLOOD PRESSURE: 70 MMHG | BODY MASS INDEX: 20.67 KG/M2 | SYSTOLIC BLOOD PRESSURE: 110 MMHG | WEIGHT: 120.4 LBS

## 2024-05-20 DIAGNOSIS — Z30.42 ENCOUNTER FOR DEPO-PROVERA CONTRACEPTION: Primary | ICD-10-CM

## 2024-05-20 PROCEDURE — 250N000011 HC RX IP 250 OP 636: Mod: JZ | Performed by: OBSTETRICS & GYNECOLOGY

## 2024-05-20 PROCEDURE — 96372 THER/PROPH/DIAG INJ SC/IM: CPT | Performed by: OBSTETRICS & GYNECOLOGY

## 2024-05-20 RX ADMIN — MEDROXYPROGESTERONE ACETATE 150 MG: 150 INJECTION, SUSPENSION INTRAMUSCULAR at 13:27

## 2024-05-20 NOTE — PROGRESS NOTES
Clinic Administered Medication Documentation      Depo Provera Documentation    Depo-Provera Standing Order inclusion/exclusion criteria reviewed.     Is this the initial or subsequent dose of Depo Provera? Subsequent dose - patient is within the acceptable window of time (11-15 weeks) for subsequent injection. Pregnancy test not indicated.    Patient meets: inclusion criteria      Is there an active order (written within the past 365 days, with administrations remaining, not ) in the chart? Yes.     Prior to injection, verified patient identity using patient's name and date of birth. Medication was administered. Please see MAR and medication order for additional information.     Vial/Syringe: Single dose vial. Was entire vial of medication used? Yes    Patient instructed to remain in clinic for 15 minutes and report any adverse reaction to staff immediately but patient declined.  NEXT INJECTION DUE: 24 - 24    Verified that the patient has refills remaining in their prescription.  Dhara Carpenter RN on 2024 at 1:24 PM  Dhara Carpenter RN on 2024 at 1:36 PM       Doxycycline Counseling:  Patient counseled regarding possible photosensitivity and increased risk for sunburn.  Patient instructed to avoid sunlight, if possible.  When exposed to sunlight, patients should wear protective clothing, sunglasses, and sunscreen.  The patient was instructed to call the office immediately if the following severe adverse effects occur:  hearing changes, easy bruising/bleeding, severe headache, or vision changes.  The patient verbalized understanding of the proper use and possible adverse effects of doxycycline.  All of the patient's questions and concerns were addressed.

## 2024-07-13 ENCOUNTER — HEALTH MAINTENANCE LETTER (OUTPATIENT)
Age: 39
End: 2024-07-13

## 2025-01-13 ENCOUNTER — ALLIED HEALTH/NURSE VISIT (OUTPATIENT)
Dept: FAMILY MEDICINE | Facility: OTHER | Age: 40
End: 2025-01-13
Payer: COMMERCIAL

## 2025-01-13 DIAGNOSIS — Z30.42 DEPO-PROVERA CONTRACEPTIVE STATUS: Primary | ICD-10-CM

## 2025-01-13 DIAGNOSIS — Z30.42 ENCOUNTER FOR DEPO-PROVERA CONTRACEPTION: Primary | ICD-10-CM

## 2025-01-13 LAB — HCG UR QL: NEGATIVE

## 2025-01-13 PROCEDURE — 96372 THER/PROPH/DIAG INJ SC/IM: CPT

## 2025-01-13 PROCEDURE — 81025 URINE PREGNANCY TEST: CPT | Mod: ZL

## 2025-01-13 PROCEDURE — 250N000011 HC RX IP 250 OP 636: Mod: JZ

## 2025-01-13 RX ORDER — MEDROXYPROGESTERONE ACETATE 150 MG/ML
150 INJECTION, SUSPENSION INTRAMUSCULAR
Status: ACTIVE | OUTPATIENT
Start: 2025-01-13

## 2025-01-13 RX ADMIN — MEDROXYPROGESTERONE ACETATE 150 MG: 150 INJECTION, SUSPENSION INTRAMUSCULAR at 13:21

## 2025-01-13 NOTE — PROGRESS NOTES
Clinic Administered Medication Documentation      Depo Provera Documentation    Depo-Provera Standing Order inclusion/exclusion criteria reviewed.     Is this the initial or subsequent dose of Depo Provera? Subsequent dose - patient is not within the acceptable window of time (11-15 weeks) for subsequent injection. Pregnancy test is indicated. Pregnancy test result: negative       Patient meets: inclusion criteria     Is there an active order (written within the past 365 days, with administrations remaining, not ) in the chart? Yes.     Prior to injection, verified patient identity using patient's name and date of birth. Medication was administered. Please see MAR and medication order for additional information.     Vial/Syringe: Single dose vial. Was entire vial of medication used? Yes    Patient instructed to remain in clinic for 15 minutes and report any adverse reaction to staff immediately.  NEXT INJECTION DUE: 3/31/25 - 25    Verified that the patient has refills remaining in their prescription.  Shorty Spence RN on 2025 at 1:22 PM   Ambulatory

## 2025-02-09 ENCOUNTER — HOSPITAL ENCOUNTER (EMERGENCY)
Facility: OTHER | Age: 40
Discharge: HOME | End: 2025-02-09
Attending: PHYSICIAN ASSISTANT
Payer: COMMERCIAL

## 2025-02-09 VITALS
HEART RATE: 82 BPM | OXYGEN SATURATION: 100 % | DIASTOLIC BLOOD PRESSURE: 82 MMHG | TEMPERATURE: 97.8 F | RESPIRATION RATE: 18 BRPM | SYSTOLIC BLOOD PRESSURE: 116 MMHG

## 2025-02-09 DIAGNOSIS — K04.7 DENTAL INFECTION: ICD-10-CM

## 2025-02-09 PROCEDURE — 99283 EMERGENCY DEPT VISIT LOW MDM: CPT | Performed by: PHYSICIAN ASSISTANT

## 2025-02-09 PROCEDURE — 250N000013 HC RX MED GY IP 250 OP 250 PS 637: Performed by: PHYSICIAN ASSISTANT

## 2025-02-09 PROCEDURE — 99284 EMERGENCY DEPT VISIT MOD MDM: CPT | Performed by: PHYSICIAN ASSISTANT

## 2025-02-09 RX ORDER — HYDROCODONE BITARTRATE AND ACETAMINOPHEN 5; 325 MG/1; MG/1
1 TABLET ORAL EVERY 6 HOURS PRN
Qty: 20 TABLET | Refills: 0 | Status: SHIPPED | OUTPATIENT
Start: 2025-02-09

## 2025-02-09 RX ORDER — PENICILLIN V POTASSIUM 500 MG/1
500 TABLET, FILM COATED ORAL 4 TIMES DAILY
Qty: 40 TABLET | Refills: 0 | Status: SHIPPED | OUTPATIENT
Start: 2025-02-09 | End: 2025-02-19

## 2025-02-09 RX ORDER — PENICILLIN V POTASSIUM 250 MG/1
500 TABLET, FILM COATED ORAL ONCE
Status: COMPLETED | OUTPATIENT
Start: 2025-02-09 | End: 2025-02-09

## 2025-02-09 RX ADMIN — PENICILLIN V POTASSIUM 500 MG: 250 TABLET, FILM COATED ORAL at 18:26

## 2025-02-09 ASSESSMENT — COLUMBIA-SUICIDE SEVERITY RATING SCALE - C-SSRS
2. HAVE YOU ACTUALLY HAD ANY THOUGHTS OF KILLING YOURSELF IN THE PAST MONTH?: NO
6. HAVE YOU EVER DONE ANYTHING, STARTED TO DO ANYTHING, OR PREPARED TO DO ANYTHING TO END YOUR LIFE?: NO
1. IN THE PAST MONTH, HAVE YOU WISHED YOU WERE DEAD OR WISHED YOU COULD GO TO SLEEP AND NOT WAKE UP?: NO

## 2025-02-09 ASSESSMENT — ACTIVITIES OF DAILY LIVING (ADL): ADLS_ACUITY_SCORE: 41

## 2025-02-09 NOTE — ED TRIAGE NOTES
Pt arrives with concerns of a dental infection and swelling. Pt states she has a cracked tooth on her right side and has some swelling in triage. Pt did develop some numbness on that side as well within the last day.   /82   Pulse 82   Temp 97.8  F (36.6  C) (Tympanic)   Resp 18   SpO2 100%         Triage Assessment (Adult)       Row Name 02/09/25 9071          Triage Assessment    Airway WDL WDL        Respiratory WDL    Respiratory WDL WDL        Skin Circulation/Temperature WDL    Skin Circulation/Temperature WDL WDL        Cardiac WDL    Cardiac WDL WDL        Peripheral/Neurovascular WDL    Peripheral Neurovascular WDL WDL        Cognitive/Neuro/Behavioral WDL    Cognitive/Neuro/Behavioral WDL WDL

## 2025-02-10 NOTE — ED PROVIDER NOTES
History     Chief Complaint   Patient presents with    Dental Pain     HPI  Ginna Landa is a 39 year old female who presents to the emergency department today for further evaluation of dental pain.    She has a broken tooth in her left lower jaw and states that she started experiencing pain over the past day or so.  She has been taking Tylenol at home with little or no relief.  She denies any fever or vomiting.  She just obtained dental insurance, and does not have a local dentist established.    She is here for further evaluation.    Allergies:  Allergies   Allergen Reactions    Amoxicillin      Other reaction(s): Stomach Upset  Stomach upset  Stomach upset    Amoxicillin-Pot Clavulanate      Other reaction(s): Vomiting    Nickel Rash    Sertraline Other (See Comments)     Nightmares, felt hallowed out       Problem List:    Patient Active Problem List    Diagnosis Date Noted    Depo-Provera contraceptive status 02/10/2023     Priority: Medium        Past Medical History:    Past Medical History:   Diagnosis Date    Pregnant state, incidental        Past Surgical History:    Past Surgical History:   Procedure Laterality Date    OTHER SURGICAL HISTORY      2013,[id number],UMBILICAL HERNIA REPAIR    [place] BREAST AUGMENTATION         Family History:    No family history on file.    Social History:  Marital Status:   [4]  Social History     Tobacco Use    Smoking status: Former     Current packs/day: 0.00     Types: Cigarettes     Quit date: 2013     Years since quittin.3     Passive exposure: Past    Smokeless tobacco: Never   Vaping Use    Vaping status: Every Day    Substances: Nicotine, Flavoring    Devices: Disposable   Substance Use Topics    Alcohol use: Yes     Comment: occ    Drug use: Never     Comment: Drug use: No        Medications:    penicillin V (VEETID) 500 MG tablet  acetaminophen (TYLENOL 8 HOUR) 650 MG CR tablet  apixaban ANTICOAGULANT (ELIQUIS) 5 MG tablet  cyclobenzaprine  (FLEXERIL) 10 MG tablet  HYDROcodone-acetaminophen (NORCO) 5-325 MG tablet  lidocaine (XYLOCAINE) 5 % external ointment  medroxyPROGESTERone (DEPO-PROVERA) 150 MG/ML IM injection  oxyCODONE (OXY-IR) 5 MG capsule  Probiotic Product (PROBIOTIC DAILY PO)          Review of Systems  All other systems were reviewed and found to be negative.      Physical Exam   BP: 116/82  Pulse: 82  Temp: 97.8  F (36.6  C)  Resp: 18  SpO2: 100 %      Physical Exam  Physical Exam:    General: Ginna Landa is a very pleasant 39 year old female found resting comfortably on the exam room bed, ABCs are self, pt is alert.    Skin: Is warm, pink, and dry.    Head: Atraumatic    Eyes: Anicteric    Mouth and Throat: Lips and surrounding mucosa are moist and pink.  She has noted to have an area of swelling to the left lateral mandibular region, consistent with dental infection.  She has noted to have a fracture to tooth #29 with no obvious purulent discharge.  I do not appreciate an obvious abscess that could be drained, or any tenting of the skin which would indicate the abscess coming to a head.    Chest: Pt has clear audible lung sounds    Musculoskeletal: Pt has good ROM in all extremities. CMS is intact with capillary refill < 2 seconds    Neurological: Pt is alert        ED Course   The patient was administered 500 mg of Pen-Vee K to initiate her antibiotic therapy.    She will be discharged home in stable condition with a prescription for Pen-Vee K 500 mg 4 times daily x 10 days, along with Norco 5-325 mg oral tablets for management of severe pain in the outpatient setting.    I advised her to seek dental consult this week and to return to the emergency department with any worrisome concerns or worsening symptoms.           No results found for this or any previous visit (from the past 24 hours).    Medications   penicillin V (VEETID) tablet 500 mg (has no administration in time range)       Assessments & Plan (with Medical Decision  Making)     I have reviewed the nursing notes.    I have reviewed the findings, diagnosis, plan and need for follow up with the patient.      Medical Decision Making  The patient's presentation was of straightforward complexity (a clearly self-limited or minor problem).    The patient's evaluation involved:  history and exam without other MDM data elements    The patient's management necessitated moderate risk (prescription drug management including medications given in the ED).        New Prescriptions    HYDROCODONE-ACETAMINOPHEN (NORCO) 5-325 MG TABLET    Take 1 tablet by mouth every 6 hours as needed for severe pain.    PENICILLIN V (VEETID) 500 MG TABLET    Take 1 tablet (500 mg) by mouth 4 times daily for 10 days.       Final diagnoses:   Dental infection       2/9/2025   [facility]       Luiz Garcias PA-C  02/09/25 1993

## 2025-02-10 NOTE — DISCHARGE INSTRUCTIONS
1. Tylenol or ibuprofen as needed for pain.  Norco as directed for severe pain.  No driving or operating machinery while taking this.  Do not take additional Tylenol while taking this, as it has Tylenol in it.  2. Antibiotic as directed  3. Stay hydrated  4. Follow-up in dental clinic this week  5. Return to the emergency department with any worrisome concerns or worsening symptoms

## 2025-04-04 ENCOUNTER — HOSPITAL ENCOUNTER (EMERGENCY)
Facility: OTHER | Age: 40
Discharge: HOME | End: 2025-04-04
Payer: COMMERCIAL

## 2025-04-04 ENCOUNTER — APPOINTMENT (OUTPATIENT)
Dept: GENERAL RADIOLOGY | Facility: OTHER | Age: 40
End: 2025-04-04
Payer: COMMERCIAL

## 2025-04-04 VITALS
OXYGEN SATURATION: 99 % | SYSTOLIC BLOOD PRESSURE: 99 MMHG | TEMPERATURE: 97.9 F | RESPIRATION RATE: 11 BRPM | WEIGHT: 120 LBS | HEIGHT: 64 IN | BODY MASS INDEX: 20.49 KG/M2 | HEART RATE: 78 BPM | DIASTOLIC BLOOD PRESSURE: 73 MMHG

## 2025-04-04 DIAGNOSIS — R07.9 CHEST PAIN, UNSPECIFIED TYPE: ICD-10-CM

## 2025-04-04 LAB
ALBUMIN SERPL BCG-MCNC: 4.9 G/DL (ref 3.5–5.2)
ALP SERPL-CCNC: 61 U/L (ref 40–150)
ALT SERPL W P-5'-P-CCNC: 13 U/L (ref 0–50)
ANION GAP SERPL CALCULATED.3IONS-SCNC: 8 MMOL/L (ref 7–15)
AST SERPL W P-5'-P-CCNC: 19 U/L (ref 0–45)
BASOPHILS # BLD AUTO: 0 10E3/UL (ref 0–0.2)
BASOPHILS NFR BLD AUTO: 0 %
BILIRUB DIRECT SERPL-MCNC: <0.08 MG/DL (ref 0–0.3)
BILIRUB SERPL-MCNC: 0.2 MG/DL
BUN SERPL-MCNC: 10.5 MG/DL (ref 6–20)
CALCIUM SERPL-MCNC: 10 MG/DL (ref 8.8–10.4)
CHLORIDE SERPL-SCNC: 106 MMOL/L (ref 98–107)
CREAT SERPL-MCNC: 0.74 MG/DL (ref 0.51–0.95)
D DIMER PPP FEU-MCNC: <=0.27 UG/ML FEU (ref 0–0.5)
EGFRCR SERPLBLD CKD-EPI 2021: >90 ML/MIN/1.73M2
EOSINOPHIL # BLD AUTO: 0.1 10E3/UL (ref 0–0.7)
EOSINOPHIL NFR BLD AUTO: 1 %
ERYTHROCYTE [DISTWIDTH] IN BLOOD BY AUTOMATED COUNT: 12.4 % (ref 10–15)
GLUCOSE SERPL-MCNC: 111 MG/DL (ref 70–99)
HCG SERPL QL: NEGATIVE
HCO3 SERPL-SCNC: 24 MMOL/L (ref 22–29)
HCT VFR BLD AUTO: 39.9 % (ref 35–47)
HGB BLD-MCNC: 13.8 G/DL (ref 11.7–15.7)
HOLD SPECIMEN: NORMAL
IMM GRANULOCYTES # BLD: 0 10E3/UL
IMM GRANULOCYTES NFR BLD: 0 %
INR PPP: 1.08 (ref 0.85–1.15)
LIPASE SERPL-CCNC: 55 U/L (ref 13–60)
LYMPHOCYTES # BLD AUTO: 2.4 10E3/UL (ref 0.8–5.3)
LYMPHOCYTES NFR BLD AUTO: 28 %
MCH RBC QN AUTO: 31.6 PG (ref 26.5–33)
MCHC RBC AUTO-ENTMCNC: 34.6 G/DL (ref 31.5–36.5)
MCV RBC AUTO: 91 FL (ref 78–100)
MONOCYTES # BLD AUTO: 0.5 10E3/UL (ref 0–1.3)
MONOCYTES NFR BLD AUTO: 5 %
NEUTROPHILS # BLD AUTO: 5.5 10E3/UL (ref 1.6–8.3)
NEUTROPHILS NFR BLD AUTO: 65 %
NRBC # BLD AUTO: 0 10E3/UL
NRBC BLD AUTO-RTO: 0 /100
PLATELET # BLD AUTO: 272 10E3/UL (ref 150–450)
POTASSIUM SERPL-SCNC: 3.9 MMOL/L (ref 3.4–5.3)
PROT SERPL-MCNC: 8.3 G/DL (ref 6.4–8.3)
RBC # BLD AUTO: 4.37 10E6/UL (ref 3.8–5.2)
SODIUM SERPL-SCNC: 138 MMOL/L (ref 135–145)
TROPONIN T SERPL HS-MCNC: <6 NG/L
TROPONIN T SERPL HS-MCNC: <6 NG/L
WBC # BLD AUTO: 8.4 10E3/UL (ref 4–11)

## 2025-04-04 PROCEDURE — 84703 CHORIONIC GONADOTROPIN ASSAY: CPT

## 2025-04-04 PROCEDURE — 250N000013 HC RX MED GY IP 250 OP 250 PS 637

## 2025-04-04 PROCEDURE — 99284 EMERGENCY DEPT VISIT MOD MDM: CPT | Mod: 25

## 2025-04-04 PROCEDURE — 93010 ELECTROCARDIOGRAM REPORT: CPT | Performed by: INTERNAL MEDICINE

## 2025-04-04 PROCEDURE — 82248 BILIRUBIN DIRECT: CPT

## 2025-04-04 PROCEDURE — 93308 TTE F-UP OR LMTD: CPT | Mod: 26

## 2025-04-04 PROCEDURE — 85379 FIBRIN DEGRADATION QUANT: CPT

## 2025-04-04 PROCEDURE — 99285 EMERGENCY DEPT VISIT HI MDM: CPT | Mod: 25

## 2025-04-04 PROCEDURE — 82310 ASSAY OF CALCIUM: CPT

## 2025-04-04 PROCEDURE — 84155 ASSAY OF PROTEIN SERUM: CPT

## 2025-04-04 PROCEDURE — 82947 ASSAY GLUCOSE BLOOD QUANT: CPT

## 2025-04-04 PROCEDURE — 93308 TTE F-UP OR LMTD: CPT | Mod: TC

## 2025-04-04 PROCEDURE — 84484 ASSAY OF TROPONIN QUANT: CPT

## 2025-04-04 PROCEDURE — 71046 X-RAY EXAM CHEST 2 VIEWS: CPT

## 2025-04-04 PROCEDURE — 85610 PROTHROMBIN TIME: CPT

## 2025-04-04 PROCEDURE — 36415 COLL VENOUS BLD VENIPUNCTURE: CPT

## 2025-04-04 PROCEDURE — 83690 ASSAY OF LIPASE: CPT

## 2025-04-04 PROCEDURE — 71046 X-RAY EXAM CHEST 2 VIEWS: CPT | Mod: 26 | Performed by: RADIOLOGY

## 2025-04-04 PROCEDURE — 85025 COMPLETE CBC W/AUTO DIFF WBC: CPT

## 2025-04-04 PROCEDURE — 93005 ELECTROCARDIOGRAM TRACING: CPT

## 2025-04-04 RX ORDER — ACETAMINOPHEN 325 MG/1
975 TABLET ORAL ONCE
Status: COMPLETED | OUTPATIENT
Start: 2025-04-04 | End: 2025-04-04

## 2025-04-04 RX ADMIN — ACETAMINOPHEN 975 MG: 325 TABLET ORAL at 20:02

## 2025-04-04 RX ADMIN — IBUPROFEN 600 MG: 200 TABLET, FILM COATED ORAL at 20:02

## 2025-04-04 ASSESSMENT — COLUMBIA-SUICIDE SEVERITY RATING SCALE - C-SSRS
1. IN THE PAST MONTH, HAVE YOU WISHED YOU WERE DEAD OR WISHED YOU COULD GO TO SLEEP AND NOT WAKE UP?: NO
2. HAVE YOU ACTUALLY HAD ANY THOUGHTS OF KILLING YOURSELF IN THE PAST MONTH?: NO
6. HAVE YOU EVER DONE ANYTHING, STARTED TO DO ANYTHING, OR PREPARED TO DO ANYTHING TO END YOUR LIFE?: NO

## 2025-04-04 ASSESSMENT — ACTIVITIES OF DAILY LIVING (ADL)
ADLS_ACUITY_SCORE: 41

## 2025-04-05 NOTE — DISCHARGE INSTRUCTIONS
You were evaluated in the Emergency Department today for chest pain.  Your tests here thankfully were negative, though we cannot fully rule out another pulmonary embolism without a CT scan.  Please try taking ibuprofen 600 mg up to every 4-6 hours to help with pain.  If you have new or worsening chest pain, shortness of breath, coughing blood, lightheadedness or passing out, leg pain or swelling, please return to the emergency department immediately.    Please see your discharge instructions for specific diagnoses and follow up. You will also find any medication changes, including new medications, in that packet. You should return to the Emergency Department if you experience new or worsening symptoms, especially chest pain, shortness of breath, fevers/chills, nausea or vomiting, you faint, or any other concerning findings. Please follow up as needed with your primary care physician.

## 2025-04-05 NOTE — ED TRIAGE NOTES
"ED Nursing Triage Note (General)   ________________________________    Ginna Landa is a 39 year old Female that presents to triage private car  with history of  chest pain reported by patient . Symptoms started 20 minutes ago. Interventions prior to arrival include none. Patient states she has had a blood clot in the past from her birth control.   /75   Pulse 108   Temp 97.9  F (36.6  C) (Tympanic)   Resp 22   Ht 1.626 m (5' 4\")   Wt 54.4 kg (120 lb)   SpO2 97%   BMI 20.60 kg/m  t  Patient appears alert  and oriented    GCS Total = 15          PRE HOSPITAL PRIOR LIVING SITUATION Spouse and Children       Triage Assessment (Adult)       Row Name 04/04/25 3059          Triage Assessment    Airway WDL WDL        Respiratory WDL    Respiratory WDL WDL        Skin Circulation/Temperature WDL    Skin Circulation/Temperature WDL WDL        Cardiac WDL    Cardiac WDL X;chest pain        Chest Pain Assessment    Chest Pain Location anterior chest, left     Character sharp     Duration 20mins     Precipitating Factors at rest     Alleviating Factors nothing     Associated Signs/Symptoms dyspnea        Peripheral/Neurovascular WDL    Peripheral Neurovascular WDL WDL        Cognitive/Neuro/Behavioral WDL    Cognitive/Neuro/Behavioral WDL WDL                     "

## 2025-04-05 NOTE — ED PROVIDER NOTES
History     Chief Complaint   Patient presents with    Chest Pain     HPI  Ginna Landa is a 39 year old female with past medical history of pulmonary embolism completed anticoagulation presenting with chest pain    Patient notes 20 minutes of sharp left-sided chest pain, nonradiating, worse with exhalation, possibly worse with inhalation, no preceding trauma, denies vomiting, diaphoresis, nausea, fevers or chills, abdominal pain.  Had been at rest on her phone when the pain began, no clear emotional trigger either.  Notes that she had a PE 2 years ago, completed 3 months of treatment with apixaban, switched from an estrogen containing to a progesterone only contraceptive.  No lower extremity pain or swelling, no cough or hemoptysis.  Notes this does not feel similar to when she had her PE    Allergies:  Allergies   Allergen Reactions    Amoxicillin      Other reaction(s): Stomach Upset  Stomach upset  Stomach upset    Amoxicillin-Pot Clavulanate      Other reaction(s): Vomiting    Nickel Rash    Sertraline Other (See Comments)     Nightmares, felt hallowed out       Problem List:    Patient Active Problem List    Diagnosis Date Noted    Depo-Provera contraceptive status 02/10/2023     Priority: Medium        Past Medical History:    Past Medical History:   Diagnosis Date    Pregnant state, incidental        Past Surgical History:    Past Surgical History:   Procedure Laterality Date    OTHER SURGICAL HISTORY      2013,VST548,UMBILICAL HERNIA REPAIR    MI BREAST AUGMENTATION         Family History:    History reviewed. No pertinent family history.    Social History:  Marital Status:   [4]  Social History     Tobacco Use    Smoking status: Former     Current packs/day: 0.00     Types: Cigarettes     Quit date: 2013     Years since quittin.5     Passive exposure: Past    Smokeless tobacco: Never   Vaping Use    Vaping status: Every Day    Substances: Nicotine, Flavoring    Devices: Disposable  "  Substance Use Topics    Alcohol use: Yes     Comment: occ    Drug use: Never     Comment: Drug use: No        Medications:    acetaminophen (TYLENOL 8 HOUR) 650 MG CR tablet  apixaban ANTICOAGULANT (ELIQUIS) 5 MG tablet  cyclobenzaprine (FLEXERIL) 10 MG tablet  HYDROcodone-acetaminophen (NORCO) 5-325 MG tablet  lidocaine (XYLOCAINE) 5 % external ointment  medroxyPROGESTERone (DEPO-PROVERA) 150 MG/ML IM injection  oxyCODONE (OXY-IR) 5 MG capsule  Probiotic Product (PROBIOTIC DAILY PO)        Review of Systems  As above  Physical Exam   BP: 117/75  Pulse: 108  Temp: 97.9  F (36.6  C)  Resp: 22  Height: 162.6 cm (5' 4\")  Weight: 54.4 kg (120 lb)  SpO2: 97 %      Physical Exam  General: Awake, alert, uncomfortable  Head: Normocephalic, atraumatic.  Eyes: Conjugate gaze.  ENT: Moist membranes, external ear appears normal.   Chest/Respiratory: Equal chest rise, unlabored respiratory effort, clear breath sounds bilaterally, no wheezes, crackles.  Left-sided costochondral tenderness, EXTR with  Cardiovascular: Peripheral pulses present, regular rate and rhythm, warm.  Abdominal: Soft, non-distended, non-tender.  Extremities: No obvious deformity.  Neurological: GCS 15, moving all extremities without gross deficit.  Skin: Warm, no rashes, lesions, or bruising.   Psychiatric: Appropriate affect.     ED Course     ED Course as of 04/05/25 0105   Fri Apr 04, 2025   1942 Hx PE 2y ago, completed 3mo apixaban, has not taken it since. Notes it was thought to be secondary to her oral contraceptive with estrogen, is currently on Depo injections   1944 EKG 12 lead  Narrow complex, regular rhythm, sinus, no QT prolongation or AR prolongation, no concerning ischemic changes, no T wave inversions   1955 D-Dimer Quantitative: <=0.27  Reassuring against PE, additionally without hypoxia, tachycardia   1956 Troponin T, High Sensitivity: <6  Will trend out of 2-hour troponin   2029 Significant tenderness during cardiac ultrasound along the " parasternal border on the left side, given negative workup thus far, suspect musculoskeletal components most likely, will trend a 2-hour troponin though given short time between chest pain onset and arrival   2029 XR Chest 2 Views  No acute findings on my interpretation   2034 Lipase: 55  Reassuring against pancreatitis   2034 Hepatic panel  Low concern for biliary pathology as cause of her left parasternal chest pain   2054 Patient notes pain starting to improve   2141 Pain continues to improve, 3/10 now   2153 Troponin T, High Sensitivity: <6  Very reassuring against ACS as cause of her chest pain   2154 Discussed with patient that while her D-dimer was negative and her troponins are negative and I believe there is a low likelihood of pulmonary embolism as the cause of her symptoms especially given her resolution of symptoms with ibuprofen here, discussed that we could obtain a CT PE given her history of PE to rule out another embolism.  Risks of that include radiation, longer wait time, cost, though risk of not performing to include missing a PE.  Patient expressed understanding and wanted to not perform a CT tonight, did discuss calling 911 immediately if she has return of her chest pain, shortness of breath, hemoptysis, leg pain, lightheadedness or passing out.     POC US ECHO LIMITED    Date/Time: 4/4/2025 8:17 PM    Performed by: Charles Suresh MD  Authorized by: Charles Suresh MD    Comments:      Cardiac ultrasound for chest pain.    Preserved left ventricular systolic function, no RV dilation, mid range by collapsible IVC, lung sliding present bilaterally    Reassuring cardiac ultrasound      Critical Care time:  none     None         Results for orders placed or performed during the hospital encounter of 04/04/25 (from the past 24 hours)   CBC with platelets differential    Narrative    The following orders were created for panel order CBC with platelets differential.  Procedure                                Abnormality         Status                     ---------                               -----------         ------                     CBC with platelets and ...[[id number]]                      Final result                 Please view results for these tests on the individual orders.   Basic metabolic panel   Result Value Ref Range    Sodium 138 135 - 145 mmol/L    Potassium 3.9 3.4 - 5.3 mmol/L    Chloride 106 98 - 107 mmol/L    Carbon Dioxide (CO2) 24 22 - 29 mmol/L    Anion Gap 8 7 - 15 mmol/L    Urea Nitrogen 10.5 6.0 - 20.0 mg/dL    Creatinine 0.74 0.51 - 0.95 mg/dL    GFR Estimate >90 >60 mL/min/1.73m2    Calcium 10.0 8.8 - 10.4 mg/dL    Glucose 111 (H) 70 - 99 mg/dL   Troponin T, High Sensitivity   Result Value Ref Range    Troponin T, High Sensitivity <6 <=14 ng/L   INR   Result Value Ref Range    INR 1.08 0.85 - 1.15   [place] Draw    Narrative    The following orders were created for panel order [place] Draw.  Procedure                               Abnormality         Status                     ---------                               -----------         ------                     Extra Red Top Tube[[id number]]                              Final result                 Please view results for these tests on the individual orders.   CBC with platelets and differential   Result Value Ref Range    WBC Count 8.4 4.0 - 11.0 10e3/uL    RBC Count 4.37 3.80 - 5.20 10e6/uL    Hemoglobin 13.8 11.7 - 15.7 g/dL    Hematocrit 39.9 35.0 - 47.0 %    MCV 91 78 - 100 fL    MCH 31.6 26.5 - 33.0 pg    MCHC 34.6 31.5 - 36.5 g/dL    RDW 12.4 10.0 - 15.0 %    Platelet Count 272 150 - 450 10e3/uL    % Neutrophils 65 %    % Lymphocytes 28 %    % Monocytes 5 %    % Eosinophils 1 %    % Basophils 0 %    % Immature Granulocytes 0 %    NRBCs per 100 WBC 0 <1 /100    Absolute Neutrophils 5.5 1.6 - 8.3 10e3/uL    Absolute Lymphocytes 2.4 0.8 - 5.3 10e3/uL    Absolute Monocytes 0.5 0.0 - 1.3 10e3/uL    Absolute Eosinophils 0.1  0.0 - 0.7 10e3/uL    Absolute Basophils 0.0 0.0 - 0.2 10e3/uL    Absolute Immature Granulocytes 0.0 <=0.4 10e3/uL    Absolute NRBCs 0.0 10e3/uL   Extra Red Top Tube   Result Value Ref Range    Hold Specimen x    D dimer quantitative   Result Value Ref Range    D-Dimer Quantitative <=0.27 0.00 - 0.50 ug/mL FEU    Narrative    This D-dimer assay is intended for use in conjunction with a clinical pretest probability assessment model to exclude pulmonary embolism (PE) and deep venous thrombosis (DVT) in outpatients suspected of PE or DVT. The cut-off value is 0.50 ug/mL FEU.   HCG qualitative   Result Value Ref Range    hCG Serum Qualitative Negative Negative   Lipase   Result Value Ref Range    Lipase 55 13 - 60 U/L   Hepatic panel   Result Value Ref Range    Protein Total 8.3 6.4 - 8.3 g/dL    Albumin 4.9 3.5 - 5.2 g/dL    Bilirubin Total 0.2 <=1.2 mg/dL    Alkaline Phosphatase 61 40 - 150 U/L    AST 19 0 - 45 U/L    ALT 13 0 - 50 U/L    Bilirubin Direct <0.08 0.00 - 0.30 mg/dL   XR Chest 2 Views    Narrative    EXAM: XR CHEST 2 VIEWS  LOCATION: [facility]  DATE: 4/4/2025    INDICATION: L lower chest pain  COMPARISON: 11/21/2023      Impression    IMPRESSION: Very tiny calcified granuloma left lung apex. Chest otherwise negative with no acute new findings.   Troponin T, High Sensitivity   Result Value Ref Range    Troponin T, High Sensitivity <6 <=14 ng/L       Medications   acetaminophen (TYLENOL) tablet 975 mg (975 mg Oral $Given 4/4/25 2002)   ibuprofen (ADVIL/MOTRIN) tablet 600 mg (600 mg Oral $Given 4/4/25 2002)       Assessments & Plan (with Medical Decision Making)     I have reviewed the nursing notes.    I have reviewed the findings, diagnosis, plan and need for follow up with the patient.        Medical Decision Making    Overall 39-year-old with history of PE (provoked, now off anticoagulation) here with chest pain.  Exam with left-sided costochondral tenderness, though is  uncomfortable appearing.  Differential includes PE, ACS, costochondritis, pneumothorax.  Plan for CBC, BMP, D-dimer, troponin and 2-hour troponin, serum hCG, chest x-ray and cardiac ultrasound, EKG, treating pain with ibuprofen and Tylenol    The patient's presentation was of high complexity (an acute health issue posing potential threat to life or bodily function).    The patient's evaluation involved:  review of external note(s) from 1 sources (reviewed prior documentation regarding PE and apixaban course)  strong consideration of a test (considered PE with negative D-dimer given patient's history of PE, however patient declined after shared decision making) that was ultimately deferred  ordering and/or review of 3+ test(s) in this encounter (see separate area of note for details)    The patient's management necessitated moderate risk (evaluation for PE and ACS including ultrasound).        Discharge Medication List as of 4/4/2025 10:03 PM          Final diagnoses:   Chest pain, unspecified type     Charles Suresh MD    4/4/2025   [facility]       Charles Suresh MD  04/05/25 0108

## 2025-04-07 LAB
ATRIAL RATE - MUSE: 91 BPM
DIASTOLIC BLOOD PRESSURE - MUSE: NORMAL MMHG
INTERPRETATION ECG - MUSE: NORMAL
P AXIS - MUSE: 53 DEGREES
PR INTERVAL - MUSE: 102 MS
QRS DURATION - MUSE: 82 MS
QT - MUSE: 360 MS
QTC - MUSE: 442 MS
R AXIS - MUSE: 62 DEGREES
SYSTOLIC BLOOD PRESSURE - MUSE: NORMAL MMHG
T AXIS - MUSE: 40 DEGREES
VENTRICULAR RATE- MUSE: 91 BPM

## 2025-04-17 ENCOUNTER — ALLIED HEALTH/NURSE VISIT (OUTPATIENT)
Dept: FAMILY MEDICINE | Facility: OTHER | Age: 40
End: 2025-04-17
Payer: COMMERCIAL

## 2025-04-17 DIAGNOSIS — Z30.42 ENCOUNTER FOR DEPO-PROVERA CONTRACEPTION: Primary | ICD-10-CM

## 2025-04-17 PROCEDURE — 250N000011 HC RX IP 250 OP 636: Mod: JZ

## 2025-04-17 PROCEDURE — 96372 THER/PROPH/DIAG INJ SC/IM: CPT

## 2025-04-17 RX ADMIN — MEDROXYPROGESTERONE ACETATE 150 MG: 150 INJECTION, SUSPENSION INTRAMUSCULAR at 15:43

## 2025-04-17 NOTE — PROGRESS NOTES
Clinic Administered Medication Documentation      Depo Provera Documentation    Depo-Provera Standing Order inclusion/exclusion criteria reviewed.     Is this the initial or subsequent dose of Depo Provera? Subsequent dose - patient is within the acceptable window of time (11-15 weeks) for subsequent injection. Pregnancy test not indicated.    Patient meets: inclusion criteria     Is there an active order (written within the past 365 days, with administrations remaining, not ) in the chart? Yes.     Prior to injection, verified patient identity using patient's name and date of birth. Medication was administered. Please see MAR and medication order for additional information.     Vial/Syringe: Single dose vial. Was entire vial of medication used? Yes    Patient instructed to remain in clinic for 15 minutes and report any adverse reaction to staff immediately.  NEXT INJECTION DUE: 7/3/25 - 25    Verified that the patient has refills remaining in their prescription.  Shorty Spence RN on 2025 at 3:41 PM

## 2025-06-17 ENCOUNTER — APPOINTMENT (OUTPATIENT)
Dept: LAB | Facility: OTHER | Age: 40
End: 2025-06-17
Attending: PHYSICIAN ASSISTANT
Payer: COMMERCIAL

## 2025-06-17 ENCOUNTER — OFFICE VISIT (OUTPATIENT)
Dept: FAMILY MEDICINE | Facility: OTHER | Age: 40
End: 2025-06-17
Attending: PHYSICIAN ASSISTANT
Payer: COMMERCIAL

## 2025-06-17 VITALS
DIASTOLIC BLOOD PRESSURE: 81 MMHG | HEART RATE: 77 BPM | OXYGEN SATURATION: 98 % | SYSTOLIC BLOOD PRESSURE: 135 MMHG | HEIGHT: 64 IN | TEMPERATURE: 98.7 F | WEIGHT: 135.8 LBS | BODY MASS INDEX: 23.18 KG/M2

## 2025-06-17 DIAGNOSIS — R53.83 FATIGUE, UNSPECIFIED TYPE: ICD-10-CM

## 2025-06-17 DIAGNOSIS — R10.84 ABDOMINAL PAIN, GENERALIZED: ICD-10-CM

## 2025-06-17 DIAGNOSIS — R63.5 WEIGHT GAIN: ICD-10-CM

## 2025-06-17 DIAGNOSIS — R73.09 ELEVATED GLUCOSE: Primary | ICD-10-CM

## 2025-06-17 LAB
EST. AVERAGE GLUCOSE BLD GHB EST-MCNC: 88 MG/DL
HBA1C MFR BLD: 4.7 %
HCG INTACT+B SERPL-ACNC: <1 MIU/ML
T4 FREE SERPL-MCNC: 1.19 NG/DL (ref 0.9–1.7)
TSH SERPL DL<=0.005 MIU/L-ACNC: 5.42 UIU/ML (ref 0.3–4.2)

## 2025-06-17 PROCEDURE — 83036 HEMOGLOBIN GLYCOSYLATED A1C: CPT | Mod: ZL | Performed by: PHYSICIAN ASSISTANT

## 2025-06-17 PROCEDURE — 36415 COLL VENOUS BLD VENIPUNCTURE: CPT | Mod: ZL | Performed by: PHYSICIAN ASSISTANT

## 2025-06-17 PROCEDURE — 84702 CHORIONIC GONADOTROPIN TEST: CPT | Mod: ZL | Performed by: PHYSICIAN ASSISTANT

## 2025-06-17 PROCEDURE — 84439 ASSAY OF FREE THYROXINE: CPT | Mod: ZL | Performed by: PHYSICIAN ASSISTANT

## 2025-06-17 PROCEDURE — 84443 ASSAY THYROID STIM HORMONE: CPT | Mod: ZL | Performed by: PHYSICIAN ASSISTANT

## 2025-06-17 PROCEDURE — 82533 TOTAL CORTISOL: CPT | Mod: ZL | Performed by: PHYSICIAN ASSISTANT

## 2025-06-17 NOTE — NURSING NOTE
"Chief Complaint   Patient presents with    Consult      Patient would like a work up done for recent extreme weight gain and hair loss. Also her periods went from being regular to very irregular.  Initial /81   Pulse 77   Temp 98.7  F (37.1  C) (Tympanic)   Ht 1.626 m (5' 4\")   Wt 61.6 kg (135 lb 12.8 oz)   LMP  (LMP Unknown)   SpO2 98%   BMI 23.31 kg/m   Estimated body mass index is 23.31 kg/m  as calculated from the following:    Height as of this encounter: 1.626 m (5' 4\").    Weight as of this encounter: 61.6 kg (135 lb 12.8 oz).  Medication Review: complete    The next two questions are to help us understand your food security.  If you are feeling you need any assistance in this area, we have resources available to support you today.          11/22/2023   SDOH- Food Insecurity   Within the past 12 months, did you worry that your food would run out before you got money to buy more? N   Within the past 12 months, did the food you bought just not last and you didn t have money to get more? Y        Data saved with a previous flowsheet row definition         Health Care Directive:  Patient does not have a Health Care Directive: Discussed advance care planning with patient; however, patient declined at this time.    Kimmie Lin MA      "

## 2025-06-17 NOTE — PROGRESS NOTES
Assessment & Plan   Problem List Items Addressed This Visit    None  Visit Diagnoses         Elevated glucose    -  Primary    Relevant Orders    Cortisol    TSH Reflex GH    Hemoglobin A1c    Adult Endocrinology  Referral    CT Abdomen Pelvis w Contrast    HCG quantitative pregnancy      Weight gain        Relevant Orders    Cortisol    TSH Reflex GH    Hemoglobin A1c    Adult Endocrinology  Referral    CT Abdomen Pelvis w Contrast    HCG quantitative pregnancy      Fatigue, unspecified type        Relevant Orders    Cortisol    TSH Reflex GH    Hemoglobin A1c    Adult Endocrinology  Referral    CT Abdomen Pelvis w Contrast    HCG quantitative pregnancy      Abdominal pain, generalized        Relevant Orders    Adult Endocrinology  Referral    CT Abdomen Pelvis w Contrast    HCG quantitative pregnancy           Fatigue, generalized abdominal pain, weight gain, and elevated glucose: Completed lab orders for cortisol, TSH, hemoglobin A1c, and hCG in order to rule out concerns.  Recently had a CBC, BMP, and liver function panel April 2025 that were normal.  Ordered a CT of the abdomen pelvis with contrast to rule out concerns.  Referred to endocrinology for consult.  Encouraged continued good diet and exercise. Return to clinic with change/worsening of symptoms.       Jose Chacko is a 39 year old, presenting for the following health issues:  Consult        6/17/2025    11:47 AM   Additional Questions   Roomed by Kimmie BARONE CMA     History of Present Illness       Reason for visit:  Weight gain hormone concerns cushings syndrome concerns  Symptom onset:  More than a month  Symptoms include:  Weight gain midsection bruising sleep issues fatigue soreness etc  Symptom intensity:  Severe  Symptom progression:  Worsening  Had these symptoms before:  No  What makes it worse:  Brain fog stress appearance comments  What makes it better:  No   She is taking medications regularly.     "  Patient is coming today due to weight gain concerns.  States that she has put on approximately 15 pounds in the last 2 months.  Started working on improving diet and exercise however she is not able to lose the weight.  Feels like this is increasing her stress.  Losing hair and not sleeping.  Feels like she is having acne breakout.  Increased weight around the midsection.  Has been on several different types of birth control in the past including Depo-Provera.  Restarted Depo-Provera in January.  Normally her weight is between 118 and 120 pounds.  She is now up to 136 pounds.  No change in diet over the last few months.  Feels like she is more conscious about what she is eating over the last few months.  Feels more swollen.  Nothing fits.  Has had left caffeine, sugar, and processed foods.  Feels mild pain on the upper and lower abdomen.  Her belly and body feels uncomfortable.  Having increased body aches.  Loss of muscle.  Hands and feet swell at times.  Drinking water.  No new medications.  No history of weight gain with Depo-Provera in the past.  Has had a little sporadic bleeding.  A few days ago she was going to the bathroom and had some little clots in the toilet that she attributed to vaginal bleeding.  Was not due for menstrual cycle.  If she has steady menstrual cycles with her birth control.  Due for Pap test.  Most recent Pap test was ASCUS.  Had chest pain approximately 2 months ago.  Went to the emergency room.  Workup was unremarkable.    Review of Systems  Constitutional, HEENT, cardiovascular, pulmonary, gi and gu systems are negative, except as otherwise noted.      Objective    /81   Pulse 77   Temp 98.7  F (37.1  C) (Tympanic)   Ht 1.626 m (5' 4\")   Wt 61.6 kg (135 lb 12.8 oz)   LMP  (LMP Unknown)   SpO2 98%   BMI 23.31 kg/m    Body mass index is 23.31 kg/m .  Physical Exam  Vitals and nursing note reviewed.   Constitutional:       General: She is not in acute distress.     " Appearance: Normal appearance. She is well-developed.   HENT:      Mouth/Throat:      Mouth: Mucous membranes are moist.      Pharynx: Oropharynx is clear. No oropharyngeal exudate or posterior oropharyngeal erythema.   Cardiovascular:      Rate and Rhythm: Normal rate and regular rhythm.      Heart sounds: Normal heart sounds, S1 normal and S2 normal. No murmur heard.  Pulmonary:      Effort: Pulmonary effort is normal. No respiratory distress.      Breath sounds: Normal breath sounds. No wheezing or rales.   Abdominal:      General: Abdomen is flat. Bowel sounds are normal.      Palpations: Abdomen is soft. There is no mass.      Tenderness: There is no right CVA tenderness, left CVA tenderness, guarding or rebound.      Hernia: No hernia is present.      Comments: Mild pain with palpation over the lateral abdomen bilaterally.  Mild pain with palpation throughout the lower abdomen.   Musculoskeletal:         General: Normal range of motion.      Cervical back: Normal range of motion. No tenderness.   Lymphadenopathy:      Cervical: No cervical adenopathy.   Skin:     General: Skin is warm and dry.      Findings: No rash.   Neurological:      General: No focal deficit present.      Mental Status: She is alert and oriented to person, place, and time.   Psychiatric:         Mood and Affect: Mood normal.         Behavior: Behavior normal.         Thought Content: Thought content normal.         Judgment: Judgment normal.          Signed Electronically by: Sofy Bolivar PA-C

## 2025-06-18 ENCOUNTER — RESULTS FOLLOW-UP (OUTPATIENT)
Dept: FAMILY MEDICINE | Facility: OTHER | Age: 40
End: 2025-06-18
Payer: COMMERCIAL

## 2025-06-18 DIAGNOSIS — E03.9 HYPOTHYROIDISM, UNSPECIFIED TYPE: Primary | ICD-10-CM

## 2025-06-18 LAB — CORTIS SERPL-MCNC: 6.7 UG/DL

## 2025-06-18 RX ORDER — LEVOTHYROXINE SODIUM 25 UG/1
25 TABLET ORAL
Qty: 90 TABLET | Refills: 1 | Status: SHIPPED | OUTPATIENT
Start: 2025-06-18

## 2025-06-18 NOTE — TELEPHONE ENCOUNTER
Patient would like to try low-dose thyroid med.     Would you like me to abiola up Synthroid?  What dose?     Collette Arzate RN on 6/18/2025 at 1:48 PM     54

## 2025-07-19 ENCOUNTER — HEALTH MAINTENANCE LETTER (OUTPATIENT)
Age: 40
End: 2025-07-19

## 2025-07-21 ENCOUNTER — ALLIED HEALTH/NURSE VISIT (OUTPATIENT)
Dept: FAMILY MEDICINE | Facility: OTHER | Age: 40
End: 2025-07-21
Attending: PHYSICIAN ASSISTANT
Payer: COMMERCIAL

## 2025-07-21 DIAGNOSIS — Z30.42 DEPO-PROVERA CONTRACEPTIVE STATUS: Primary | ICD-10-CM

## 2025-07-21 PROCEDURE — 96372 THER/PROPH/DIAG INJ SC/IM: CPT

## 2025-07-21 PROCEDURE — 250N000011 HC RX IP 250 OP 636

## 2025-07-21 RX ADMIN — MEDROXYPROGESTERONE ACETATE 150 MG: 150 INJECTION, SUSPENSION INTRAMUSCULAR at 12:59

## (undated) RX ORDER — MEDROXYPROGESTERONE ACETATE 150 MG/ML
INJECTION, SUSPENSION INTRAMUSCULAR
Status: DISPENSED
Start: 2025-07-21

## (undated) RX ORDER — KETOROLAC TROMETHAMINE 30 MG/ML
INJECTION, SOLUTION INTRAMUSCULAR; INTRAVENOUS
Status: DISPENSED
Start: 2023-11-21

## (undated) RX ORDER — MEDROXYPROGESTERONE ACETATE 150 MG/ML
INJECTION, SUSPENSION INTRAMUSCULAR
Status: DISPENSED
Start: 2024-05-20

## (undated) RX ORDER — MEDROXYPROGESTERONE ACETATE 150 MG/ML
INJECTION, SUSPENSION INTRAMUSCULAR
Status: DISPENSED
Start: 2025-04-17

## (undated) RX ORDER — HYDROCODONE BITARTRATE AND ACETAMINOPHEN 5; 325 MG/1; MG/1
TABLET ORAL
Status: DISPENSED
Start: 2023-11-21

## (undated) RX ORDER — ONDANSETRON 2 MG/ML
INJECTION INTRAMUSCULAR; INTRAVENOUS
Status: DISPENSED
Start: 2023-11-21

## (undated) RX ORDER — FENTANYL CITRATE 50 UG/ML
INJECTION, SOLUTION INTRAMUSCULAR; INTRAVENOUS
Status: DISPENSED
Start: 2023-11-20

## (undated) RX ORDER — MEDROXYPROGESTERONE ACETATE 150 MG/ML
INJECTION, SUSPENSION INTRAMUSCULAR
Status: DISPENSED
Start: 2024-02-26

## (undated) RX ORDER — KETOROLAC TROMETHAMINE 15 MG/ML
INJECTION, SOLUTION INTRAMUSCULAR; INTRAVENOUS
Status: DISPENSED
Start: 2023-11-20

## (undated) RX ORDER — MEDROXYPROGESTERONE ACETATE 150 MG/ML
INJECTION, SUSPENSION INTRAMUSCULAR
Status: DISPENSED
Start: 2023-02-09

## (undated) RX ORDER — MEDROXYPROGESTERONE ACETATE 150 MG/ML
INJECTION, SUSPENSION INTRAMUSCULAR
Status: DISPENSED
Start: 2025-01-13

## (undated) RX ORDER — MEDROXYPROGESTERONE ACETATE 150 MG/ML
INJECTION, SUSPENSION INTRAMUSCULAR
Status: DISPENSED
Start: 2023-11-28

## (undated) RX ORDER — IBUPROFEN 200 MG
TABLET ORAL
Status: DISPENSED
Start: 2025-04-04

## (undated) RX ORDER — ACETAMINOPHEN 10 MG/ML
INJECTION, SOLUTION INTRAVENOUS
Status: DISPENSED
Start: 2023-11-21

## (undated) RX ORDER — ACETAMINOPHEN 325 MG/1
TABLET ORAL
Status: DISPENSED
Start: 2025-04-04

## (undated) RX ORDER — IPRATROPIUM BROMIDE AND ALBUTEROL SULFATE 2.5; .5 MG/3ML; MG/3ML
SOLUTION RESPIRATORY (INHALATION)
Status: DISPENSED
Start: 2023-11-20

## (undated) RX ORDER — IBUPROFEN 400 MG/1
TABLET, FILM COATED ORAL
Status: DISPENSED
Start: 2025-04-04